# Patient Record
Sex: MALE | Race: WHITE | NOT HISPANIC OR LATINO | Employment: OTHER | URBAN - METROPOLITAN AREA
[De-identification: names, ages, dates, MRNs, and addresses within clinical notes are randomized per-mention and may not be internally consistent; named-entity substitution may affect disease eponyms.]

---

## 2017-12-28 PROCEDURE — 96372 THER/PROPH/DIAG INJ SC/IM: CPT

## 2017-12-28 PROCEDURE — 303977 HCHG I & D

## 2017-12-28 PROCEDURE — 99284 EMERGENCY DEPT VISIT MOD MDM: CPT

## 2017-12-29 ENCOUNTER — HOSPITAL ENCOUNTER (EMERGENCY)
Facility: MEDICAL CENTER | Age: 42
End: 2017-12-29
Attending: EMERGENCY MEDICINE
Payer: MEDICAID

## 2017-12-29 VITALS
TEMPERATURE: 98.7 F | WEIGHT: 200.62 LBS | BODY MASS INDEX: 29.71 KG/M2 | RESPIRATION RATE: 16 BRPM | HEIGHT: 69 IN | DIASTOLIC BLOOD PRESSURE: 75 MMHG | SYSTOLIC BLOOD PRESSURE: 119 MMHG | HEART RATE: 100 BPM | OXYGEN SATURATION: 93 %

## 2017-12-29 DIAGNOSIS — L02.811 SCALP ABSCESS: ICD-10-CM

## 2017-12-29 DIAGNOSIS — L03.811 CELLULITIS OF HEAD OR SCALP: ICD-10-CM

## 2017-12-29 PROCEDURE — 700111 HCHG RX REV CODE 636 W/ 250 OVERRIDE (IP): Performed by: EMERGENCY MEDICINE

## 2017-12-29 PROCEDURE — 306637 HCHG MISC ORTHO ITEM RC 0274

## 2017-12-29 PROCEDURE — A9270 NON-COVERED ITEM OR SERVICE: HCPCS | Performed by: EMERGENCY MEDICINE

## 2017-12-29 PROCEDURE — 700102 HCHG RX REV CODE 250 W/ 637 OVERRIDE(OP): Performed by: EMERGENCY MEDICINE

## 2017-12-29 PROCEDURE — 10061 I&D ABSCESS COMP/MULTIPLE: CPT

## 2017-12-29 RX ORDER — CEPHALEXIN 250 MG/1
500 CAPSULE ORAL ONCE
Status: COMPLETED | OUTPATIENT
Start: 2017-12-29 | End: 2017-12-29

## 2017-12-29 RX ORDER — CEPHALEXIN 250 MG/1
250 CAPSULE ORAL 4 TIMES DAILY
Qty: 28 CAP | Refills: 0 | Status: SHIPPED | OUTPATIENT
Start: 2017-12-29 | End: 2018-01-05

## 2017-12-29 RX ORDER — SULFAMETHOXAZOLE AND TRIMETHOPRIM 800; 160 MG/1; MG/1
1 TABLET ORAL ONCE
Status: COMPLETED | OUTPATIENT
Start: 2017-12-29 | End: 2017-12-29

## 2017-12-29 RX ORDER — KETOROLAC TROMETHAMINE 30 MG/ML
30 INJECTION, SOLUTION INTRAMUSCULAR; INTRAVENOUS ONCE
Status: COMPLETED | OUTPATIENT
Start: 2017-12-29 | End: 2017-12-29

## 2017-12-29 RX ORDER — SULFAMETHOXAZOLE AND TRIMETHOPRIM 800; 160 MG/1; MG/1
1 TABLET ORAL 2 TIMES DAILY
Qty: 14 TAB | Refills: 0 | Status: SHIPPED | OUTPATIENT
Start: 2017-12-29 | End: 2018-01-05

## 2017-12-29 RX ORDER — METHADONE HYDROCHLORIDE 10 MG/1
10 TABLET ORAL
Status: SHIPPED | COMMUNITY
End: 2019-05-05

## 2017-12-29 RX ORDER — HYDROMORPHONE HYDROCHLORIDE 2 MG/ML
2 INJECTION, SOLUTION INTRAMUSCULAR; INTRAVENOUS; SUBCUTANEOUS ONCE
Status: COMPLETED | OUTPATIENT
Start: 2017-12-29 | End: 2017-12-29

## 2017-12-29 RX ADMIN — KETOROLAC TROMETHAMINE 30 MG: 30 INJECTION, SOLUTION INTRAMUSCULAR at 02:15

## 2017-12-29 RX ADMIN — HYDROMORPHONE HYDROCHLORIDE 2 MG: 2 INJECTION, SOLUTION INTRAMUSCULAR; INTRAVENOUS; SUBCUTANEOUS at 02:32

## 2017-12-29 RX ADMIN — CEPHALEXIN 500 MG: 250 CAPSULE ORAL at 02:33

## 2017-12-29 RX ADMIN — SULFAMETHOXAZOLE AND TRIMETHOPRIM 1 TABLET: 800; 160 TABLET ORAL at 02:33

## 2017-12-29 ASSESSMENT — PAIN SCALES - GENERAL: PAINLEVEL_OUTOF10: 7

## 2017-12-29 NOTE — ED NOTES
Discharge instructions provided.  Rx x1 given and educated on how and when to take medication, educated on s/s of infection and wound care, Pt verbalized the understanding of discharge instructions to follow up with PCP and to return to ER if condition worsens.  Pt ambulated out of ER without difficulty.

## 2017-12-29 NOTE — ED NOTES
".  Chief Complaint   Patient presents with   • Abscess     since last monday, pt fell area to back of his neck R side swollen, pt came in today due to feeling the abscess \"rupture and leak\"      .Blood pressure 132/86, pulse 95, temperature 37.1 °C (98.7 °F), resp. rate 16, height 1.753 m (5' 9\"), weight 91 kg (200 lb 9.9 oz), SpO2 95 %.      "

## 2017-12-29 NOTE — ED PROVIDER NOTES
"ED Provider Note    CHIEF COMPLAINT  Chief Complaint   Patient presents with   • Abscess       HPI  Familia Robert is a 42 y.o. male who presents To the emergency department with chief complaints of posterior scalp abscess. The patient states started swelling Monday he put an ice pack on it went down and then he continued to swell actually ruptured last night. He continues to have pain in the back of the head but denies fevers chills nausea or vomiting vision changes or severe headache. He mostly has pain at the site of his scalp injury. Never had abscesses before is up-to-date on his tetanus, pain is currently a 6 out of 10 and sharp in nature worse with palpation, the patient is on methadone and oxycodone for chronic arthritis pain.    REVIEW OF SYSTEMS  See HPI for further details. All other systems are negative.     PAST MEDICAL HISTORY   has a past medical history of Chronic back pain.    SOCIAL HISTORY  Social History     Social History Main Topics   • Smoking status: Never Smoker   • Smokeless tobacco: Never Used   • Alcohol use Yes   • Drug use: No   • Sexual activity: Not on file       SURGICAL HISTORY  patient denies any surgical history    CURRENT MEDICATIONS  Home Medications    **Home medications have not yet been reviewed for this encounter**         ALLERGIES  Allergies   Allergen Reactions   • Nkda [No Known Drug Allergy]        PHYSICAL EXAM  VITAL SIGNS: /78   Pulse 99   Temp 37.1 °C (98.7 °F)   Resp 16   Ht 1.753 m (5' 9\")   Wt 91 kg (200 lb 9.9 oz)   SpO2 96%   BMI 29.63 kg/m²    Pulse ox interpretation: I interpret this pulse ox as normal.  Constitutional: Alert in no apparent distress.  HENT: Normocephalic, Atraumatic  R posterior occipital scalp abscess with fluctuance, erythema and mild discharge - 8x5cm , 2nd smaller left scalp abscess only 1 x 2 cm  Eyes: PERRL. Conjunctiva normal, non-icteric.   Heart: Regular rate and rythm, no murmurs.    Lungs: Clear to auscultation " bilaterally. No resp distress, breath sounds equal  Abdomen: Non-tender, non-distended, normal bowel sounds  Skin: Warm, Dry, No erythema, No rash.   Neurologic: Alert and oriented, Grossly non-focal.       DIFFERENTIAL DIAGNOSIS AND WORK UP PLAN    This is a 42 y.o. male who presents with 2 abscesses of the scalp, patient has some drainage will perform bilateral I&D is as well as treatment for sialitis abscess with Bactrim Keflex.      INCISION AND DRAINAGE PROCEDURE NOTE:  Patient identification was confirmed and consent was obtained.  This procedure was performed by Dr. Ivey at 2:23 AM.  Site: R posterior scalp  Sterile procedures observed  Anesthetic used (type and amt): lidocaine 1% w epi 6 cc's  Blade size: 11  Drainage: moderate blood and pus  The patient had extreme pain during this procedure that was not relieved with Toradol nor lidocaine, I still had a large amount of pus coming out and the patient needed to stop. He'll be treated with IM Dilaudid and then reattempted the continuing I&D as well as the 2nd I&D  Site anesthetized, incision made over site, wound drained and explored loculations, rinsed with copious amounts of normal saline    INCISION AND DRAINAGE PROCEDURE NOTE:  Patient identification was confirmed and consent was obtained.  This procedure was performed by Dr. Ivey at 3:13 AM  Site: posterior scalp  Sterile procedures observed  Anesthetic used (type and amt): lidocaine 1% with epi  Blade size: 1, cruciate incisions made in both abscesses  Drainage:  Moderate blood and pus    Site anesthetized, incision made over site, wound drained and explored loculations, rinsed with copious amounts of normal saline, covered with dry, sterile dressing. Pt tolerated procedure well without complications. Instructions for care discussed verbally and pt provided with additional written instructions for homecare and f/u.        COURSE & MEDICAL DECISION MAKING  Pertinent Labs & Imaging studies reviewed.  "(See chart for details)    3:15 AM  Patient tolerated much better after the Dilaudid was started on a large amount of pus out and I was just getting small amounts of blood by the and with copious drainage of breaking up loculations. Patient actually has follow-up with his primary care provider later this morning and he will return to the ED with any new or worsening symptoms such as headache and severe neck pain worsening fevers and chills despite treatment at home. He will wash out his wound at least 3-4 times over the next 2-3 days.    /75   Pulse 100   Temp 37.1 °C (98.7 °F)   Resp 16   Ht 1.753 m (5' 9\")   Wt 91 kg (200 lb 9.9 oz)   SpO2 93%   BMI 29.63 kg/m²     The patient will return for new or worsening symptoms and is stable at the time of discharge.    The patient is referred to a primary physician for blood pressure management, diabetic screening, and for all other preventative health concerns.    DISPOSITION:  Patient will be discharged home in stable condition.    FOLLOW UP:  Your Primary today as scheduled          [facility], Emergency Dept  [address]  [facility] [address]  [phone]    If symptoms worsen - fevers, vomiting, worsening swelling despite treatment      OUTPATIENT MEDICATIONS:  New Prescriptions    CEPHALEXIN (KEFLEX) 250 MG CAP    Take 1 Cap by mouth 4 times a day for 7 days.    SULFAMETHOXAZOLE-TRIMETHOPRIM (BACTRIM DS) 800-160 MG TABLET    Take 1 Tab by mouth 2 times a day for 7 days.           FINAL IMPRESSION  1. Scalp abscess    2. Cellulitis of head or scalp            Electronically signed by: Bailey Ivey, 12/29/2017 1:18 AM    This dictation has been created using voice recognition software and/or scribes. The accuracy of the dictation is limited by the abilities of the software and the expertise of the scribes. I expect there may be some errors of grammar and possibly content. I made every attempt to manually correct the " errors within my dictation. However, errors related to voice recognition software and/or scribes may still exist and should be interpreted within the appropriate context.

## 2017-12-29 NOTE — DISCHARGE INSTRUCTIONS
Abscess  Care After  An abscess (also called a boil or furuncle) is an infected area that contains a collection of pus. Signs and symptoms of an abscess include pain, tenderness, redness, or hardness, or you may feel a moveable soft area under your skin. An abscess can occur anywhere in the body. The infection may spread to surrounding tissues causing cellulitis. A cut (incision) by the surgeon was made over your abscess and the pus was drained out. The boil may be painful for 5 to 7 days. Most people with a boil do not have high fevers. Your abscess, if seen early, may not have localized, and may not have been lanced. If not, another appointment may be required for this if it does not get better on its own or with medications.  HOME CARE INSTRUCTIONS   · Only take over-the-counter or prescription medicines for pain, discomfort, or fever as directed by your caregiver.  · Washout the wound at least 3-4 times each day over the next 2-3 days copiously.  SEEK IMMEDIATE MEDICAL CARE IF:   · You develop increased pain, swelling, redness, drainage, or bleeding in the wound site.  · You develop signs of generalized infection including muscle aches, chills, fever, or a general ill feeling.  · An oral temperature above 102° F (38.9° C) develops, not controlled by medication.  See your caregiver for a recheck if you develop any of the symptoms described above. If medications (antibiotics) were prescribed, take them as directed.  Document Released: 07/06/2006 Document Revised: 03/11/2013 Document Reviewed: 03/02/2009  [employer]® Patient Information ©2014 [email or web address].

## 2019-05-05 ENCOUNTER — ANESTHESIA EVENT (OUTPATIENT)
Dept: SURGERY | Facility: MEDICAL CENTER | Age: 44
DRG: 340 | End: 2019-05-05
Payer: MEDICAID

## 2019-05-05 ENCOUNTER — HOSPITAL ENCOUNTER (INPATIENT)
Facility: MEDICAL CENTER | Age: 44
LOS: 2 days | DRG: 340 | End: 2019-05-07
Attending: EMERGENCY MEDICINE | Admitting: SURGERY
Payer: MEDICAID

## 2019-05-05 ENCOUNTER — APPOINTMENT (OUTPATIENT)
Dept: RADIOLOGY | Facility: MEDICAL CENTER | Age: 44
DRG: 340 | End: 2019-05-05
Attending: EMERGENCY MEDICINE
Payer: MEDICAID

## 2019-05-05 ENCOUNTER — ANESTHESIA (OUTPATIENT)
Dept: SURGERY | Facility: MEDICAL CENTER | Age: 44
DRG: 340 | End: 2019-05-05
Payer: MEDICAID

## 2019-05-05 DIAGNOSIS — G89.18 POSTOPERATIVE PAIN: ICD-10-CM

## 2019-05-05 DIAGNOSIS — K35.201 ACUTE APPENDICITIS WITH PERFORATION AND GENERALIZED PERITONITIS, WITHOUT ABSCESS, UNSPECIFIED WHETHER GANGRENE PRESENT: ICD-10-CM

## 2019-05-05 LAB
ALBUMIN SERPL BCP-MCNC: 4.7 G/DL (ref 3.2–4.9)
ALBUMIN/GLOB SERPL: 1.5 G/DL
ALP SERPL-CCNC: 65 U/L (ref 30–99)
ALT SERPL-CCNC: 11 U/L (ref 2–50)
ANION GAP SERPL CALC-SCNC: 13 MMOL/L (ref 0–11.9)
AST SERPL-CCNC: 14 U/L (ref 12–45)
BASOPHILS # BLD AUTO: 0.1 % (ref 0–1.8)
BASOPHILS # BLD: 0.02 K/UL (ref 0–0.12)
BILIRUB SERPL-MCNC: 0.9 MG/DL (ref 0.1–1.5)
BUN SERPL-MCNC: 13 MG/DL (ref 8–22)
CALCIUM SERPL-MCNC: 9.8 MG/DL (ref 8.5–10.5)
CHLORIDE SERPL-SCNC: 98 MMOL/L (ref 96–112)
CO2 SERPL-SCNC: 24 MMOL/L (ref 20–33)
CREAT SERPL-MCNC: 0.82 MG/DL (ref 0.5–1.4)
EOSINOPHIL # BLD AUTO: 0.01 K/UL (ref 0–0.51)
EOSINOPHIL NFR BLD: 0.1 % (ref 0–6.9)
ERYTHROCYTE [DISTWIDTH] IN BLOOD BY AUTOMATED COUNT: 40.2 FL (ref 35.9–50)
GLOBULIN SER CALC-MCNC: 3.1 G/DL (ref 1.9–3.5)
GLUCOSE SERPL-MCNC: 151 MG/DL (ref 65–99)
HCT VFR BLD AUTO: 46.1 % (ref 42–52)
HGB BLD-MCNC: 16.1 G/DL (ref 14–18)
IMM GRANULOCYTES # BLD AUTO: 0.06 K/UL (ref 0–0.11)
IMM GRANULOCYTES NFR BLD AUTO: 0.4 % (ref 0–0.9)
LIPASE SERPL-CCNC: 25 U/L (ref 11–82)
LYMPHOCYTES # BLD AUTO: 0.7 K/UL (ref 1–4.8)
LYMPHOCYTES NFR BLD: 4.2 % (ref 22–41)
MCH RBC QN AUTO: 29.4 PG (ref 27–33)
MCHC RBC AUTO-ENTMCNC: 34.9 G/DL (ref 33.7–35.3)
MCV RBC AUTO: 84.1 FL (ref 81.4–97.8)
MONOCYTES # BLD AUTO: 1.06 K/UL (ref 0–0.85)
MONOCYTES NFR BLD AUTO: 6.4 % (ref 0–13.4)
NEUTROPHILS # BLD AUTO: 14.77 K/UL (ref 1.82–7.42)
NEUTROPHILS NFR BLD: 88.8 % (ref 44–72)
NRBC # BLD AUTO: 0 K/UL
NRBC BLD-RTO: 0 /100 WBC
PLATELET # BLD AUTO: 272 K/UL (ref 164–446)
PMV BLD AUTO: 9.2 FL (ref 9–12.9)
POTASSIUM SERPL-SCNC: 3.6 MMOL/L (ref 3.6–5.5)
PROT SERPL-MCNC: 7.8 G/DL (ref 6–8.2)
RBC # BLD AUTO: 5.48 M/UL (ref 4.7–6.1)
SODIUM SERPL-SCNC: 135 MMOL/L (ref 135–145)
WBC # BLD AUTO: 16.6 K/UL (ref 4.8–10.8)

## 2019-05-05 PROCEDURE — 501429 HCHG STAPLER, ENDO GIA UNIV: Performed by: SURGERY

## 2019-05-05 PROCEDURE — 160039 HCHG SURGERY MINUTES - EA ADDL 1 MIN LEVEL 3: Performed by: SURGERY

## 2019-05-05 PROCEDURE — 304561 HCHG STAT O2

## 2019-05-05 PROCEDURE — 96375 TX/PRO/DX INJ NEW DRUG ADDON: CPT

## 2019-05-05 PROCEDURE — 88304 TISSUE EXAM BY PATHOLOGIST: CPT

## 2019-05-05 PROCEDURE — 160035 HCHG PACU - 1ST 60 MINS PHASE I: Performed by: SURGERY

## 2019-05-05 PROCEDURE — 501399 HCHG SPECIMAN BAG, ENDO CATC: Performed by: SURGERY

## 2019-05-05 PROCEDURE — 700101 HCHG RX REV CODE 250: Performed by: ANESTHESIOLOGY

## 2019-05-05 PROCEDURE — 160028 HCHG SURGERY MINUTES - 1ST 30 MINS LEVEL 3: Performed by: SURGERY

## 2019-05-05 PROCEDURE — 36415 COLL VENOUS BLD VENIPUNCTURE: CPT

## 2019-05-05 PROCEDURE — 700117 HCHG RX CONTRAST REV CODE 255: Performed by: EMERGENCY MEDICINE

## 2019-05-05 PROCEDURE — 96376 TX/PRO/DX INJ SAME DRUG ADON: CPT

## 2019-05-05 PROCEDURE — 160009 HCHG ANES TIME/MIN: Performed by: SURGERY

## 2019-05-05 PROCEDURE — 500002 HCHG ADHESIVE, DERMABOND: Performed by: SURGERY

## 2019-05-05 PROCEDURE — 96365 THER/PROPH/DIAG IV INF INIT: CPT

## 2019-05-05 PROCEDURE — 700111 HCHG RX REV CODE 636 W/ 250 OVERRIDE (IP): Performed by: EMERGENCY MEDICINE

## 2019-05-05 PROCEDURE — 160002 HCHG RECOVERY MINUTES (STAT): Performed by: SURGERY

## 2019-05-05 PROCEDURE — 700111 HCHG RX REV CODE 636 W/ 250 OVERRIDE (IP): Performed by: SURGERY

## 2019-05-05 PROCEDURE — 160036 HCHG PACU - EA ADDL 30 MINS PHASE I: Performed by: SURGERY

## 2019-05-05 PROCEDURE — 700101 HCHG RX REV CODE 250

## 2019-05-05 PROCEDURE — 501583 HCHG TROCAR, THRD CAN&SEAL 5X100: Performed by: SURGERY

## 2019-05-05 PROCEDURE — 700111 HCHG RX REV CODE 636 W/ 250 OVERRIDE (IP): Performed by: ANESTHESIOLOGY

## 2019-05-05 PROCEDURE — 501463 HCHG STAPLES, UNIV. ROTIC: Performed by: SURGERY

## 2019-05-05 PROCEDURE — 500868 HCHG NEEDLE, SURGI(VARES): Performed by: SURGERY

## 2019-05-05 PROCEDURE — 700102 HCHG RX REV CODE 250 W/ 637 OVERRIDE(OP): Performed by: SURGERY

## 2019-05-05 PROCEDURE — 502571 HCHG PACK, LAP CHOLE: Performed by: SURGERY

## 2019-05-05 PROCEDURE — 0DTJ4ZZ RESECTION OF APPENDIX, PERCUTANEOUS ENDOSCOPIC APPROACH: ICD-10-PCS | Performed by: SURGERY

## 2019-05-05 PROCEDURE — 700105 HCHG RX REV CODE 258: Performed by: SURGERY

## 2019-05-05 PROCEDURE — 700105 HCHG RX REV CODE 258: Performed by: ANESTHESIOLOGY

## 2019-05-05 PROCEDURE — 770006 HCHG ROOM/CARE - MED/SURG/GYN SEMI*

## 2019-05-05 PROCEDURE — 501571 HCHG TROCAR, SEPARATOR 12X100: Performed by: SURGERY

## 2019-05-05 PROCEDURE — 501570 HCHG TROCAR, SEPARATOR: Performed by: SURGERY

## 2019-05-05 PROCEDURE — 85025 COMPLETE CBC W/AUTO DIFF WBC: CPT

## 2019-05-05 PROCEDURE — 700101 HCHG RX REV CODE 250: Performed by: SURGERY

## 2019-05-05 PROCEDURE — 83690 ASSAY OF LIPASE: CPT

## 2019-05-05 PROCEDURE — 160048 HCHG OR STATISTICAL LEVEL 1-5: Performed by: SURGERY

## 2019-05-05 PROCEDURE — A9270 NON-COVERED ITEM OR SERVICE: HCPCS | Performed by: SURGERY

## 2019-05-05 PROCEDURE — 700105 HCHG RX REV CODE 258: Performed by: EMERGENCY MEDICINE

## 2019-05-05 PROCEDURE — 99291 CRITICAL CARE FIRST HOUR: CPT

## 2019-05-05 PROCEDURE — 80053 COMPREHEN METABOLIC PANEL: CPT

## 2019-05-05 PROCEDURE — 74177 CT ABD & PELVIS W/CONTRAST: CPT

## 2019-05-05 RX ORDER — ONDANSETRON 2 MG/ML
8 INJECTION INTRAMUSCULAR; INTRAVENOUS ONCE
Status: COMPLETED | OUTPATIENT
Start: 2019-05-05 | End: 2019-05-05

## 2019-05-05 RX ORDER — OXYCODONE HYDROCHLORIDE AND ACETAMINOPHEN 5; 325 MG/1; MG/1
2 TABLET ORAL
Status: DISCONTINUED | OUTPATIENT
Start: 2019-05-05 | End: 2019-05-05 | Stop reason: HOSPADM

## 2019-05-05 RX ORDER — PIPERACILLIN SODIUM, TAZOBACTAM SODIUM 3; .375 G/15ML; G/15ML
INJECTION, POWDER, LYOPHILIZED, FOR SOLUTION INTRAVENOUS PRN
Status: DISCONTINUED | OUTPATIENT
Start: 2019-05-05 | End: 2019-05-05 | Stop reason: SURG

## 2019-05-05 RX ORDER — OXYCODONE HCL 20 MG/1
20 TABLET, FILM COATED, EXTENDED RELEASE ORAL EVERY 12 HOURS
Status: DISCONTINUED | OUTPATIENT
Start: 2019-05-05 | End: 2019-05-07 | Stop reason: HOSPADM

## 2019-05-05 RX ORDER — ONDANSETRON 2 MG/ML
4 INJECTION INTRAMUSCULAR; INTRAVENOUS
Status: DISCONTINUED | OUTPATIENT
Start: 2019-05-05 | End: 2019-05-05 | Stop reason: HOSPADM

## 2019-05-05 RX ORDER — MAGNESIUM HYDROXIDE 1200 MG/15ML
LIQUID ORAL
Status: COMPLETED | OUTPATIENT
Start: 2019-05-05 | End: 2019-05-05

## 2019-05-05 RX ORDER — HYDROMORPHONE HYDROCHLORIDE 1 MG/ML
0.2 INJECTION, SOLUTION INTRAMUSCULAR; INTRAVENOUS; SUBCUTANEOUS
Status: DISCONTINUED | OUTPATIENT
Start: 2019-05-05 | End: 2019-05-05 | Stop reason: HOSPADM

## 2019-05-05 RX ORDER — ONDANSETRON 2 MG/ML
INJECTION INTRAMUSCULAR; INTRAVENOUS PRN
Status: DISCONTINUED | OUTPATIENT
Start: 2019-05-05 | End: 2019-05-05 | Stop reason: SURG

## 2019-05-05 RX ORDER — MEPERIDINE HYDROCHLORIDE 25 MG/ML
6.25 INJECTION INTRAMUSCULAR; INTRAVENOUS; SUBCUTANEOUS
Status: DISCONTINUED | OUTPATIENT
Start: 2019-05-05 | End: 2019-05-05 | Stop reason: HOSPADM

## 2019-05-05 RX ORDER — ONDANSETRON 2 MG/ML
4 INJECTION INTRAMUSCULAR; INTRAVENOUS EVERY 4 HOURS PRN
Status: DISCONTINUED | OUTPATIENT
Start: 2019-05-05 | End: 2019-05-07 | Stop reason: HOSPADM

## 2019-05-05 RX ORDER — HYDROMORPHONE HYDROCHLORIDE 1 MG/ML
0.1 INJECTION, SOLUTION INTRAMUSCULAR; INTRAVENOUS; SUBCUTANEOUS
Status: DISCONTINUED | OUTPATIENT
Start: 2019-05-05 | End: 2019-05-05 | Stop reason: HOSPADM

## 2019-05-05 RX ORDER — SODIUM CHLORIDE, SODIUM LACTATE, POTASSIUM CHLORIDE, CALCIUM CHLORIDE 600; 310; 30; 20 MG/100ML; MG/100ML; MG/100ML; MG/100ML
INJECTION, SOLUTION INTRAVENOUS CONTINUOUS
Status: DISCONTINUED | OUTPATIENT
Start: 2019-05-05 | End: 2019-05-05 | Stop reason: ALTCHOICE

## 2019-05-05 RX ORDER — OXYCODONE HYDROCHLORIDE 5 MG/1
10 TABLET ORAL
Status: DISCONTINUED | OUTPATIENT
Start: 2019-05-05 | End: 2019-05-06

## 2019-05-05 RX ORDER — SODIUM CHLORIDE, SODIUM LACTATE, POTASSIUM CHLORIDE, CALCIUM CHLORIDE 600; 310; 30; 20 MG/100ML; MG/100ML; MG/100ML; MG/100ML
INJECTION, SOLUTION INTRAVENOUS
Status: COMPLETED | OUTPATIENT
Start: 2019-05-05 | End: 2019-05-05

## 2019-05-05 RX ORDER — KETAMINE HYDROCHLORIDE 50 MG/ML
INJECTION, SOLUTION INTRAMUSCULAR; INTRAVENOUS PRN
Status: DISCONTINUED | OUTPATIENT
Start: 2019-05-05 | End: 2019-05-05 | Stop reason: SURG

## 2019-05-05 RX ORDER — KETOROLAC TROMETHAMINE 30 MG/ML
30 INJECTION, SOLUTION INTRAMUSCULAR; INTRAVENOUS EVERY 6 HOURS
Status: DISCONTINUED | OUTPATIENT
Start: 2019-05-06 | End: 2019-05-07 | Stop reason: HOSPADM

## 2019-05-05 RX ORDER — BUPIVACAINE HYDROCHLORIDE AND EPINEPHRINE 5; 5 MG/ML; UG/ML
INJECTION, SOLUTION EPIDURAL; INTRACAUDAL; PERINEURAL
Status: DISCONTINUED | OUTPATIENT
Start: 2019-05-05 | End: 2019-05-05 | Stop reason: HOSPADM

## 2019-05-05 RX ORDER — MIDAZOLAM HYDROCHLORIDE 1 MG/ML
1 INJECTION INTRAMUSCULAR; INTRAVENOUS
Status: DISCONTINUED | OUTPATIENT
Start: 2019-05-05 | End: 2019-05-05 | Stop reason: HOSPADM

## 2019-05-05 RX ORDER — ONDANSETRON 2 MG/ML
4 INJECTION INTRAMUSCULAR; INTRAVENOUS ONCE
Status: COMPLETED | OUTPATIENT
Start: 2019-05-05 | End: 2019-05-05

## 2019-05-05 RX ORDER — MAGNESIUM SULFATE HEPTAHYDRATE 40 MG/ML
INJECTION, SOLUTION INTRAVENOUS PRN
Status: DISCONTINUED | OUTPATIENT
Start: 2019-05-05 | End: 2019-05-05 | Stop reason: SURG

## 2019-05-05 RX ORDER — DIPHENHYDRAMINE HYDROCHLORIDE 50 MG/ML
12.5 INJECTION INTRAMUSCULAR; INTRAVENOUS
Status: DISCONTINUED | OUTPATIENT
Start: 2019-05-05 | End: 2019-05-05 | Stop reason: HOSPADM

## 2019-05-05 RX ORDER — OXYCODONE HYDROCHLORIDE AND ACETAMINOPHEN 5; 325 MG/1; MG/1
1 TABLET ORAL
Status: DISCONTINUED | OUTPATIENT
Start: 2019-05-05 | End: 2019-05-05 | Stop reason: HOSPADM

## 2019-05-05 RX ORDER — HYDROMORPHONE HYDROCHLORIDE 1 MG/ML
0.4 INJECTION, SOLUTION INTRAMUSCULAR; INTRAVENOUS; SUBCUTANEOUS
Status: DISCONTINUED | OUTPATIENT
Start: 2019-05-05 | End: 2019-05-05 | Stop reason: HOSPADM

## 2019-05-05 RX ORDER — SODIUM CHLORIDE, SODIUM LACTATE, POTASSIUM CHLORIDE, CALCIUM CHLORIDE 600; 310; 30; 20 MG/100ML; MG/100ML; MG/100ML; MG/100ML
INJECTION, SOLUTION INTRAVENOUS
Status: DISCONTINUED | OUTPATIENT
Start: 2019-05-05 | End: 2019-05-05 | Stop reason: SURG

## 2019-05-05 RX ORDER — OXYCODONE HYDROCHLORIDE 5 MG/1
20 TABLET ORAL
Status: DISCONTINUED | OUTPATIENT
Start: 2019-05-05 | End: 2019-05-06

## 2019-05-05 RX ORDER — HALOPERIDOL 5 MG/ML
1 INJECTION INTRAMUSCULAR
Status: DISCONTINUED | OUTPATIENT
Start: 2019-05-05 | End: 2019-05-05 | Stop reason: HOSPADM

## 2019-05-05 RX ORDER — HYDROMORPHONE HYDROCHLORIDE 1 MG/ML
1 INJECTION, SOLUTION INTRAMUSCULAR; INTRAVENOUS; SUBCUTANEOUS ONCE
Status: COMPLETED | OUTPATIENT
Start: 2019-05-05 | End: 2019-05-05

## 2019-05-05 RX ORDER — HEPARIN SODIUM 5000 [USP'U]/ML
5000 INJECTION, SOLUTION INTRAVENOUS; SUBCUTANEOUS EVERY 8 HOURS
Status: DISCONTINUED | OUTPATIENT
Start: 2019-05-06 | End: 2019-05-07 | Stop reason: HOSPADM

## 2019-05-05 RX ORDER — HYDROMORPHONE HYDROCHLORIDE 2 MG/ML
INJECTION, SOLUTION INTRAMUSCULAR; INTRAVENOUS; SUBCUTANEOUS PRN
Status: DISCONTINUED | OUTPATIENT
Start: 2019-05-05 | End: 2019-05-05 | Stop reason: SURG

## 2019-05-05 RX ORDER — KETOROLAC TROMETHAMINE 30 MG/ML
INJECTION, SOLUTION INTRAMUSCULAR; INTRAVENOUS PRN
Status: DISCONTINUED | OUTPATIENT
Start: 2019-05-05 | End: 2019-05-05 | Stop reason: SURG

## 2019-05-05 RX ORDER — LABETALOL HYDROCHLORIDE 5 MG/ML
5 INJECTION, SOLUTION INTRAVENOUS
Status: DISCONTINUED | OUTPATIENT
Start: 2019-05-05 | End: 2019-05-05 | Stop reason: HOSPADM

## 2019-05-05 RX ORDER — SODIUM CHLORIDE 9 MG/ML
1000 INJECTION, SOLUTION INTRAVENOUS ONCE
Status: COMPLETED | OUTPATIENT
Start: 2019-05-05 | End: 2019-05-05

## 2019-05-05 RX ORDER — ACETAMINOPHEN 500 MG
1000 TABLET ORAL EVERY 6 HOURS
Status: DISCONTINUED | OUTPATIENT
Start: 2019-05-05 | End: 2019-05-07 | Stop reason: HOSPADM

## 2019-05-05 RX ORDER — DEXAMETHASONE SODIUM PHOSPHATE 4 MG/ML
INJECTION, SOLUTION INTRA-ARTICULAR; INTRALESIONAL; INTRAMUSCULAR; INTRAVENOUS; SOFT TISSUE PRN
Status: DISCONTINUED | OUTPATIENT
Start: 2019-05-05 | End: 2019-05-05 | Stop reason: SURG

## 2019-05-05 RX ORDER — DEXTROSE MONOHYDRATE, SODIUM CHLORIDE, AND POTASSIUM CHLORIDE 50; 1.49; 4.5 G/1000ML; G/1000ML; G/1000ML
INJECTION, SOLUTION INTRAVENOUS CONTINUOUS
Status: DISCONTINUED | OUTPATIENT
Start: 2019-05-05 | End: 2019-05-06

## 2019-05-05 RX ORDER — DEXAMETHASONE SODIUM PHOSPHATE 4 MG/ML
4 INJECTION, SOLUTION INTRA-ARTICULAR; INTRALESIONAL; INTRAMUSCULAR; INTRAVENOUS; SOFT TISSUE
Status: COMPLETED | OUTPATIENT
Start: 2019-05-05 | End: 2019-05-06

## 2019-05-05 RX ADMIN — OXYCODONE HYDROCHLORIDE 10 MG: 5 TABLET ORAL at 22:35

## 2019-05-05 RX ADMIN — PIPERACILLIN AND TAZOBACTAM 3.38 G: 3; .375 INJECTION, POWDER, LYOPHILIZED, FOR SOLUTION INTRAVENOUS; PARENTERAL at 15:15

## 2019-05-05 RX ADMIN — POTASSIUM CHLORIDE, DEXTROSE MONOHYDRATE AND SODIUM CHLORIDE: 150; 5; 450 INJECTION, SOLUTION INTRAVENOUS at 18:26

## 2019-05-05 RX ADMIN — PROPOFOL 30 MG: 10 INJECTION, EMULSION INTRAVENOUS at 16:35

## 2019-05-05 RX ADMIN — HYDROMORPHONE HYDROCHLORIDE 1 MG: 2 INJECTION, SOLUTION INTRAMUSCULAR; INTRAVENOUS; SUBCUTANEOUS at 16:35

## 2019-05-05 RX ADMIN — ACETAMINOPHEN 1000 MG: 500 TABLET ORAL at 18:26

## 2019-05-05 RX ADMIN — LIDOCAINE HYDROCHLORIDE 100 MG: 20 INJECTION, SOLUTION INFILTRATION; PERINEURAL at 15:53

## 2019-05-05 RX ADMIN — KETOROLAC TROMETHAMINE 30 MG: 30 INJECTION, SOLUTION INTRAMUSCULAR at 16:30

## 2019-05-05 RX ADMIN — ONDANSETRON 8 MG: 2 INJECTION INTRAMUSCULAR; INTRAVENOUS at 14:41

## 2019-05-05 RX ADMIN — METRONIDAZOLE 500 MG: 500 INJECTION, SOLUTION INTRAVENOUS at 21:57

## 2019-05-05 RX ADMIN — ONDANSETRON 4 MG: 2 INJECTION INTRAMUSCULAR; INTRAVENOUS at 15:54

## 2019-05-05 RX ADMIN — HYDROMORPHONE HYDROCHLORIDE 1 MG: 1 INJECTION, SOLUTION INTRAMUSCULAR; INTRAVENOUS; SUBCUTANEOUS at 13:33

## 2019-05-05 RX ADMIN — PIPERACILLIN SODIUM AND TAZOBACTAM SODIUM 3.38 G: 3; .375 INJECTION, POWDER, FOR SOLUTION INTRAVENOUS at 15:54

## 2019-05-05 RX ADMIN — ONDANSETRON 4 MG: 2 INJECTION INTRAMUSCULAR; INTRAVENOUS at 13:31

## 2019-05-05 RX ADMIN — ROCURONIUM BROMIDE 30 MG: 10 INJECTION, SOLUTION INTRAVENOUS at 15:54

## 2019-05-05 RX ADMIN — KETAMINE HYDROCHLORIDE 50 MG: 50 INJECTION, SOLUTION, CONCENTRATE INTRAMUSCULAR; INTRAVENOUS at 15:51

## 2019-05-05 RX ADMIN — IOHEXOL 25 ML: 240 INJECTION, SOLUTION INTRATHECAL; INTRAVASCULAR; INTRAVENOUS; ORAL at 14:17

## 2019-05-05 RX ADMIN — SUGAMMADEX 200 MG: 100 INJECTION, SOLUTION INTRAVENOUS at 16:36

## 2019-05-05 RX ADMIN — DEXAMETHASONE SODIUM PHOSPHATE 8 MG: 4 INJECTION, SOLUTION INTRA-ARTICULAR; INTRALESIONAL; INTRAMUSCULAR; INTRAVENOUS; SOFT TISSUE at 15:54

## 2019-05-05 RX ADMIN — SODIUM CHLORIDE, POTASSIUM CHLORIDE, SODIUM LACTATE AND CALCIUM CHLORIDE: 600; 310; 30; 20 INJECTION, SOLUTION INTRAVENOUS at 15:50

## 2019-05-05 RX ADMIN — IOHEXOL 100 ML: 350 INJECTION, SOLUTION INTRAVENOUS at 14:16

## 2019-05-05 RX ADMIN — PROPOFOL 80 MG: 10 INJECTION, EMULSION INTRAVENOUS at 15:54

## 2019-05-05 RX ADMIN — HYDROMORPHONE HYDROCHLORIDE 0.5 MG: 2 INJECTION, SOLUTION INTRAMUSCULAR; INTRAVENOUS; SUBCUTANEOUS at 16:18

## 2019-05-05 RX ADMIN — MIDAZOLAM HYDROCHLORIDE 2 MG: 1 INJECTION, SOLUTION INTRAMUSCULAR; INTRAVENOUS at 15:51

## 2019-05-05 RX ADMIN — OXYCODONE HYDROCHLORIDE 20 MG: 20 TABLET, FILM COATED, EXTENDED RELEASE ORAL at 17:33

## 2019-05-05 RX ADMIN — CEFTRIAXONE SODIUM 2 G: 2 INJECTION, POWDER, FOR SOLUTION INTRAMUSCULAR; INTRAVENOUS at 18:26

## 2019-05-05 RX ADMIN — HYDROMORPHONE HYDROCHLORIDE 1 MG: 1 INJECTION, SOLUTION INTRAMUSCULAR; INTRAVENOUS; SUBCUTANEOUS at 14:43

## 2019-05-05 RX ADMIN — OXYCODONE HYDROCHLORIDE 20 MG: 5 TABLET ORAL at 18:26

## 2019-05-05 RX ADMIN — KETOROLAC TROMETHAMINE 30 MG: 30 INJECTION, SOLUTION INTRAMUSCULAR at 23:34

## 2019-05-05 RX ADMIN — SODIUM CHLORIDE 1000 ML: 9 INJECTION, SOLUTION INTRAVENOUS at 13:29

## 2019-05-05 RX ADMIN — MAGNESIUM SULFATE IN WATER 2 G: 40 INJECTION, SOLUTION INTRAVENOUS at 15:51

## 2019-05-05 RX ADMIN — HYDROMORPHONE HYDROCHLORIDE 0.5 MG: 2 INJECTION, SOLUTION INTRAMUSCULAR; INTRAVENOUS; SUBCUTANEOUS at 15:57

## 2019-05-05 RX ADMIN — ACETAMINOPHEN 1000 MG: 500 TABLET ORAL at 23:34

## 2019-05-05 ASSESSMENT — COGNITIVE AND FUNCTIONAL STATUS - GENERAL
MOVING FROM LYING ON BACK TO SITTING ON SIDE OF FLAT BED: A LITTLE
STANDING UP FROM CHAIR USING ARMS: A LITTLE
HELP NEEDED FOR BATHING: A LITTLE
SUGGESTED CMS G CODE MODIFIER MOBILITY: CK
DAILY ACTIVITIY SCORE: 20
MOBILITY SCORE: 18
CLIMB 3 TO 5 STEPS WITH RAILING: A LITTLE
DRESSING REGULAR UPPER BODY CLOTHING: A LITTLE
WALKING IN HOSPITAL ROOM: A LITTLE
TOILETING: A LITTLE
TURNING FROM BACK TO SIDE WHILE IN FLAT BAD: A LITTLE
DRESSING REGULAR LOWER BODY CLOTHING: A LITTLE
SUGGESTED CMS G CODE MODIFIER DAILY ACTIVITY: CJ
MOVING TO AND FROM BED TO CHAIR: A LITTLE

## 2019-05-05 ASSESSMENT — PATIENT HEALTH QUESTIONNAIRE - PHQ9
1. LITTLE INTEREST OR PLEASURE IN DOING THINGS: NOT AT ALL
SUM OF ALL RESPONSES TO PHQ9 QUESTIONS 1 AND 2: 0
2. FEELING DOWN, DEPRESSED, IRRITABLE, OR HOPELESS: NOT AT ALL

## 2019-05-05 ASSESSMENT — PAIN SCALES - GENERAL: PAIN_LEVEL: 5

## 2019-05-05 ASSESSMENT — LIFESTYLE VARIABLES: DO YOU DRINK ALCOHOL: NO

## 2019-05-05 NOTE — ED PROVIDER NOTES
"ED Provider  Scribed for Seymour Koo D.O. by Mana Elizalde. 5/5/2019  1:18 PM    Means of arrival:Walk-In  History obtained from:Patient  History limited by: None    CHIEF COMPLAINT  Chief Complaint   Patient presents with   • Abdominal Pain     ruq worse   • N/V     HPI  Familia Robert is a 43 y.o. male who presents to the ED complaining of abdominal pain with associated vomiting onset 3 days ago.  The patient states that the pain has been constant but worsened last night which prompted him to come to the ED. He states the pain is concentrated on the right side, but radiated to his left side. The patient additionally endorses tactile fever, and loss of apetite but denies dysuria, urinary frequency, urinary urgency, and diarrhea. He states his last normal bowel movement was last Thursday and normal. The patient takes 30mg oxycodone for osteoarthritis twice a day and denies alcohol usage and past abdominal surgery. There are no known alleviating or worsening factors.     REVIEW OF SYSTEMS  See HPI for further details. All other systems are negative.     PAST MEDICAL HISTORY   has a past medical history of Chronic back pain.    SOCIAL HISTORY  Social History     Social History Main Topics   • Smoking status: Never Smoker   • Smokeless tobacco: Never Used   • Alcohol use No   • Drug use: No       SURGICAL HISTORY  patient denies any surgical history    CURRENT MEDICATIONS  Home Medications     Reviewed by Laverne Wilcox R.N. (Registered Nurse) on 05/05/19 at 1254  Med List Status: Partial   Medication Last Dose Status   oxycodone (OXY-IR) 30 MG immediate release tablet 5/4/2019 Active                ALLERGIES  Allergies   Allergen Reactions   • Nkda [No Known Drug Allergy]        PHYSICAL EXAM  VITAL SIGNS: /86   Pulse 94   Temp 37.4 °C (99.3 °F) (Temporal)   Resp 18   Ht 1.753 m (5' 9\")   Wt 79.4 kg (175 lb)   SpO2 91%   BMI 25.84 kg/m²     Pulse ox interpretation: pulse ox is " normal.  Constitutional: Unkempt and odorous. Alert in no apparent distress.  HENT: Poor dentition. No signs of trauma, Bilateral external ears normal, Nose normal.   Eyes: Pupils are equal and reactive, Conjunctiva normal, Non-icteric.   Neck: Normal range of motion, No tenderness, Supple, No stridor.   Lymphatic: No lymphadenopathy noted.   Cardiovascular: Regular rate and rhythm, no murmurs.   Thorax & Lungs: Normal breath sounds, No respiratory distress, No wheezing, No chest tenderness.   Abdomen: Moderate diffuse tenderness with voluntary guarding, worse to right upper quadrant. No distension.  Bowel sounds normal, Soft, No masses, No pulsatile masses. No peritoneal signs.  Skin: Warm, Dry, No erythema, No rash.   Back: No bony tenderness, No CVA tenderness.   Extremities: Intact distal pulses, No edema, No tenderness, No cyanosis,  Negative Uriel's sign.   Musculoskeletal: Good range of motion in all major joints. No tenderness to palpation or major deformities noted.   Neurologic: Alert , Normal motor function, Normal sensory function, No focal deficits noted.   Psychiatric: Affect normal, Judgment normal, Mood normal.       MEDICAL DECISION MAKING  This is a 43 y.o. male who presents complaints of right upper quadrant pain that is now radiated diffusely.  He has a white blood cell count is elevated.  And his CT does show concerns for ruptured appendicitis.  Talk with the surgeon and the patient has been admitted.  IV antibiotics were initiated here, and I with moderate results.    Critical care time 30 minutes    DIAGNOSTIC STUDIES / PROCEDURES    LABS  Results for orders placed or performed during the hospital encounter of 05/05/19   CBC WITH DIFFERENTIAL   Result Value Ref Range    WBC 16.6 (H) 4.8 - 10.8 K/uL    RBC 5.48 4.70 - 6.10 M/uL    Hemoglobin 16.1 14.0 - 18.0 g/dL    Hematocrit 46.1 42.0 - 52.0 %    MCV 84.1 81.4 - 97.8 fL    MCH 29.4 27.0 - 33.0 pg    MCHC 34.9 33.7 - 35.3 g/dL    RDW 40.2 35.9  - 50.0 fL    Platelet Count 272 164 - 446 K/uL    MPV 9.2 9.0 - 12.9 fL    Neutrophils-Polys 88.80 (H) 44.00 - 72.00 %    Lymphocytes 4.20 (L) 22.00 - 41.00 %    Monocytes 6.40 0.00 - 13.40 %    Eosinophils 0.10 0.00 - 6.90 %    Basophils 0.10 0.00 - 1.80 %    Immature Granulocytes 0.40 0.00 - 0.90 %    Nucleated RBC 0.00 /100 WBC    Neutrophils (Absolute) 14.77 (H) 1.82 - 7.42 K/uL    Lymphs (Absolute) 0.70 (L) 1.00 - 4.80 K/uL    Monos (Absolute) 1.06 (H) 0.00 - 0.85 K/uL    Eos (Absolute) 0.01 0.00 - 0.51 K/uL    Baso (Absolute) 0.02 0.00 - 0.12 K/uL    Immature Granulocytes (abs) 0.06 0.00 - 0.11 K/uL    NRBC (Absolute) 0.00 K/uL   COMP METABOLIC PANEL   Result Value Ref Range    Sodium 135 135 - 145 mmol/L    Potassium 3.6 3.6 - 5.5 mmol/L    Chloride 98 96 - 112 mmol/L    Co2 24 20 - 33 mmol/L    Anion Gap 13.0 (H) 0.0 - 11.9    Glucose 151 (H) 65 - 99 mg/dL    Bun 13 8 - 22 mg/dL    Creatinine 0.82 0.50 - 1.40 mg/dL    Calcium 9.8 8.5 - 10.5 mg/dL    AST(SGOT) 14 12 - 45 U/L    ALT(SGPT) 11 2 - 50 U/L    Alkaline Phosphatase 65 30 - 99 U/L    Total Bilirubin 0.9 0.1 - 1.5 mg/dL    Albumin 4.7 3.2 - 4.9 g/dL    Total Protein 7.8 6.0 - 8.2 g/dL    Globulin 3.1 1.9 - 3.5 g/dL    A-G Ratio 1.5 g/dL   LIPASE   Result Value Ref Range    Lipase 25 11 - 82 U/L   ESTIMATED GFR   Result Value Ref Range    GFR If African American >60 >60 mL/min/1.73 m 2    GFR If Non African American >60 >60 mL/min/1.73 m 2     All labs reviewed by me.    RADIOLOGY  CT-ABDOMEN-PELVIS WITH   Final Result      1.  Enlarged appendix with surrounding inflammation is noted consistent with appendicitis. Trace amount of extraluminal air in the region is also noted which could indicate early signs of rupture.      2.  No abscess or nondependent free air is identified at this time.           The radiologist's interpretations of all radiological studies have been reviewed by me.        COURSE  Pertinent Labs & Imaging studies reviewed. (See chart  for details)    1:18 PM - Patient seen and examined at bedside. Discussed plan of care, including IV fluids for dehydration and CT scan to rule out cholecystitis. Patient agrees to the plan of care. The patient will be medicated with zofran 4mg and dilaudid 1mg. Ordered for CT-abdomen, CBC with diff, CMP, lipase, and estimated GFR to evaluate his symptoms. Differential diagnoses include but not limited to: Acute cholecystitis gallbladder disease pancreatitis appendicitis peritonitis.    2:31 PM - I reviewed the labs that showed a white blood count of 16, CT showed probably ruptured appendicitis.  Antibiotics and IV fluids are being initiated. Patient's pain is mildly improved with medication.     2:32 - Paged general surgery.     2:39 PM - Consulted with Dr. Jolly (General surgery), who will make arrangements for surgery.      DISPOSITION:  Patient will most likely go to OR today for surgery.       FINAL IMPRESSION  No diagnosis found.  Acute appendicitis, acute peritonitis, ruptured appendicitis     Mana MCCAULEY (Rosanneibe), am scribing for, and in the presence of, Seymour Koo D.O..    Electronically signed by: Mana Elizalde (Scribkeeley), 5/5/2019    ISeymour D.O. personally performed the services described in this documentation, as scribed by Mana Elizalde in my presence, and it is both accurate and complete. C.     The note accurately reflects work and decisions made by me.  Seymour Koo  5/5/2019  7:32 PM

## 2019-05-05 NOTE — CONSULTS
Surgical History and Physical    Date: 5/5/2019    Requesting Physician: Dr. Koo     Consulting Physician: Antony Jolly M.D. [facility]    Reason for consultation: abdominl pain    CC: abdominal pain    HPI: This is a 43 y.o. male who is presenting with a two day history of right lower quadrant abdominal pain. Pain is sharp, severe, and constant. Pain is associated with nausea, vomiting, obstipation. No fevers of chills. He has not had similar pain in the past.     Past Medical History:   Diagnosis Date   • Chronic back pain        History reviewed. No pertinent surgical history.    Current Facility-Administered Medications   Medication Dose Route Frequency Provider Last Rate Last Dose   • piperacillin-tazobactam (ZOSYN) 3.375 g in  mL IVPB  3.375 g Intravenous Once Seymour Koo D.O.   3.375 g at 05/05/19 1515       Social History     Social History   • Marital status: Single     Spouse name: N/A   • Number of children: N/A   • Years of education: N/A     Occupational History   • Not on file.     Social History Main Topics   • Smoking status: Never Smoker   • Smokeless tobacco: Never Used   • Alcohol use No   • Drug use: No   • Sexual activity: Not on file     Other Topics Concern   • Not on file     Social History Narrative   • No narrative on file       History reviewed. No pertinent family history.    Allergies:  Nkda [no known drug allergy]    Review of Systems:  Constitutional: Negative for fever, chills, weight loss, malaise/fatigue and diaphoresis.   HENT: Negative for hearing loss, ear pain, nosebleeds, congestion, sore throat, neck pain, tinnitus and ear discharge.    Eyes: Negative for blurred vision, double vision, photophobia, pain, discharge and redness.   Respiratory: Negative for cough, hemoptysis, sputum production, shortness of breath, wheezing and stridor.    Cardiovascular: Negative for chest pain, palpitations, orthopnea, claudication, leg swelling and PND.  "  Gastrointestinal: as above   Genitourinary: Negative for dysuria, urgency, frequency, hematuria and flank pain.   Musculoskeletal: Negative for myalgias, back pain, joint pain and falls.   Skin: Negative for itching and rash.  Neurological: Negative for dizziness, tingling, tremors, sensory change, speech change, focal weakness, seizures, loss of consciousness, weakness and headaches.   Endo/Heme/Allergies: Negative for environmental allergies and polydipsia. Does not bruise/bleed easily.   Psychiatric/Behavioral: Negative for depression, suicidal ideas, hallucinations, memory loss and substance abuse. The patient is not nervous/anxious and does not have insomnia.    Physical Exam:  /86   Pulse 73   Temp 37.8 °C (100 °F) (Oral)   Resp (!) 24   Ht 1.753 m (5' 9\")   Wt 79.4 kg (175 lb)   SpO2 94%     Constitutional: he is oriented to person, place, and time.  he appears well-developed and well-nourished. Moderate distress.   Head: Normocephalic and atraumatic.   Neck: Normal range of motion. Neck supple. No JVD present. No tracheal deviation present. No thyromegaly present.   Cardiovascular: Normal rate, regular rhythm, normal heart sounds and intact distal pulses.  Exam reveals no gallop and no friction rub.  No murmur heard.  Pulmonary/Chest: Effort normal and breath sounds normal. No stridor. No respiratory distress. he has no wheezes. She has no rales.   Abdominal: Soft. Bowel sounds are normal. he exhibits no distension and no mass. There is  Tenderness with guarding in the right lower quadrant and positive Rovsing's sign. Musculoskeletal: Normal range of motion. he exhibits no edema and no tenderness.   Neurological: he is alert and oriented to person, place, and time. he has normal reflexes. No cranial nerve deficit. Coordination normal.   Skin: Skin is warm and dry. No rash noted. he is not diaphoretic. No erythema. No pallor.   Psychiatric: he has a normal mood and affect.  Behavior is normal.   "     Labs:  Recent Labs      05/05/19   1320   WBC  16.6*   RBC  5.48   HEMOGLOBIN  16.1   HEMATOCRIT  46.1   MCV  84.1   MCH  29.4   MCHC  34.9   RDW  40.2   PLATELETCT  272   MPV  9.2     Recent Labs      05/05/19   1320   SODIUM  135   POTASSIUM  3.6   CHLORIDE  98   CO2  24   GLUCOSE  151*   BUN  13   CREATININE  0.82   CALCIUM  9.8         Recent Labs      05/05/19   1320   ASTSGOT  14   ALTSGPT  11   TBILIRUBIN  0.9   ALKPHOSPHAT  65   GLOBULIN  3.1       Radiology:  CT:  1.  Enlarged appendix with surrounding inflammation is noted consistent with appendicitis. Trace amount of extraluminal air in the region is also noted which could indicate early signs of rupture.    2.  No abscess or nondependent free air is identified at this time.    Assessment: This is a 43 y.o. With acute appendicitis    Plan: to OR for laparoscopic appendectomy. NPO, IVF, antibiotics in meantime    Thank you very much for this consultation.    Antony Jolly M.D.  [facility]  [phone]

## 2019-05-05 NOTE — ANESTHESIA TIME REPORT
Anesthesia Start and Stop Event Times     Date Time Event    5/5/2019 1550 Anesthesia Start     1649 Anesthesia Stop        Responsible Staff  05/05/19    Name Role Begin End    Vinay Olmos M.D. Anesth 1550 1649        Preop Diagnosis (Free Text):  Pre-op Diagnosis     Appendicitis        Preop Diagnosis (Codes):  Diagnosis Information     Diagnosis Code(s):         Post op Diagnosis  Acute appendicitis      Premium Reason  B. 1st Call    Comments:

## 2019-05-05 NOTE — ANESTHESIA QCDR
2019 [facility] Clinical Data Registry (for Quality Improvement)     Postoperative nausea/vomiting risk protocol (Adult = 18 yrs and Pediatric 3-17 yrs)- (430 and 463)  General inhalation anesthetic (NOT TIVA) with PONV risk factors: Yes  Provision of anti-emetic therapy with at least 2 different classes of agents: Yes   Patient DID NOT receive anti-emetic therapy and reason is documented in Medical Record:  N/A    Multimodal Pain Management- (AQI59)  Patient undergoing Elective Surgery (i.e. Outpatient, or ASC, or Prescheduled Surgery prior to Hospital Admission): No  Use of Multimodal Pain Management, two or more drugs and/or interventions, NOT including systemic opioids: N/A  Exception: Documented allergy to multiple classes of analgesics: N/A    PACU assessment of acute postoperative pain prior to Anesthesia Care End- Applies to Patients Age = 18- (ABG7)  Initial PACU pain score is which of the following: < 7/10  Patient unable to report pain score: N/A    Post-anesthetic transfer of care checklist/protocol to PACU/ICU- (426 and 427)  Upon conclusion of case, patient transferred to which of the following locations: PACU/Non-ICU  Use of transfer checklist/protocol: Yes  Exclusion: Service Performed in Patient Hospital Room (and thus did not require transfer): N/A    PACU Reintubation- (AQI31)  General anesthesia requiring endotracheal intubation (ETT) along with subsequent extubation in OR or PACU: Yes  Required reintubation in the PACU: No   Extubation was a planned trial documented in the medical record prior to removal of the original airway device:  N/A    Unplanned admission to ICU related to anesthesia service up through end of PACU care- (MD51)  Unplanned admission to ICU (not initially anticipated at anesthesia start time): No

## 2019-05-05 NOTE — ANESTHESIA PROCEDURE NOTES
Airway  Date/Time: 5/5/2019 3:56 PM  Performed by: LAN HATHAWAY  Authorized by: LAN HATHAWAY     Location:  OR  Urgency:  Elective  Indications for Airway Management:  Anesthesia  Spontaneous Ventilation: absent    Sedation Level:  Deep  Patient Position:  Sniffing  Mask Difficulty Assessment:  1 - vent by mask  Final Airway Type:  Endotracheal airway  Final Endotracheal Airway:  ETT  Cuffed: Yes    Technique Used for Successful ETT Placement:  Direct laryngoscopy  Insertion Site:  Oral  Blade Type:  Toure  Laryngoscope Blade/Videolaryngoscope Blade Size:  2  ETT Size (mm):  7.5  Measured from:  Lips  ETT to Lips (cm):  21  Placement Verified by: capnometry    Cormack-Lehane Classification:  Grade I - full view of glottis  Number of Attempts at Approach:  1

## 2019-05-05 NOTE — ED NOTES
Med Rec complete per Pt at bedside  Allergies Reviewed  No ABX in the last 30 days     OXYCODONE verified via [employer]Check

## 2019-05-05 NOTE — ED TRIAGE NOTES
.  Chief Complaint   Patient presents with   • Abdominal Pain     ruq worse   • N/V   Agree with triage assessment.  8/10 RUQ pain.  Last emesis approximately 1215 PTA.  No lightheadedness.  No chest pain.  No diarrhea.  Last BM on Thursday.  Pt denies alcohol use.

## 2019-05-05 NOTE — OP REPORT
Operative Report    Date: 5/5/2019    Surgeon: Antony Jolly    Assistant: none    Anesthesiologist: Dr. Olmos    Pre-operative Diagnosis: acute appendicitis        Post-operative Diagnosis: same     Procedure: laparoscopic appendectomy    Findings: distended, nearly ruptured appendix with puss in pelvis and around liver    Procedure in detail: The patient was identified in the pre-operative holding area and brought to the operating room. Correct side and site were identified. Pre-operative antibiotics of Zosyn were administered prior to the procedure. GETA was smoothly induced. The patient was prepped and draped in the usual sterile fashion.     After infiltration of local anesthetic, an incision was made inferior the umbilicus to accomodate a 5 mm trocar. The veress needle was placed intraperitoneally, and sterile saline drop test was performed. The abdomen was insufflated to 15 mmHg, which the patient tolerated well, with initially low insufflation pressures. A 5 mm trocar was placed, and a 5 mm 30 degree lapararoscope was used to survey the abdomen. No evidence of intraperitoneal trauma from Veress or trocar placement was found.    I then placed, under video assistance, a 5 mm trocar in the suprapubic area, and a 12 mm trocar in the left lower quadrant. I visualized the cecum and found the appendix, it was distended with impending ruputre. The appendix was elevated with an atraumatic grasper, a window was created in its mesentery with a [place] dissector, and the appendiceal mesentery was divided with the harmonic. The base of the appendix was divided with a blue load of the endoGIA stapler. The appendix was placed in an endocatch bag and removed from the 12 mm port site. Copious irrigation was performed. The appendiceal artery and base were hemostatic.    All ports were removed with video assist, and were hemostatic. The 12 mm port site fascia was closed with 0 vicryl suture. All skin sites were closed  with subcuticular Monocryl and band-aids were applied.    The patient was awakened from general anesthetic, and was taken to the recovery room in stable condition.    Sponge and needle counts were correct at the end of the case.     Specimen: appendix for permanent pathology    EBL: 20 cc    Dispo: stable, extubated, to PACU    Antony Jolly M.D.  [facility]  [phone]

## 2019-05-05 NOTE — ANESTHESIA PREPROCEDURE EVALUATION
43yoM with chronic back pain on chronic opiates (oxycodone 30mg 4 times daily) presenting with acute appendicitis.    NKDA  Appropriately NPO      Relevant Problems   Within Normal Limits   ANESTHESIA   PULMONARY       Physical Exam    Airway   Mallampati: II       Cardiovascular - normal exam     Dental - normal exam         Pulmonary - normal exam     Abdominal    Neurological - normal exam                 Anesthesia Plan    ASA 2 - emergent   ASA physical status emergent criteria: acute peritonitis    Plan - general       Airway plan will be ETT        Induction: intravenous    Postoperative Plan: Postoperative administration of opioids is intended.    Pertinent diagnostic labs and testing reviewed    Informed Consent:    Anesthetic plan and risks discussed with patient.

## 2019-05-05 NOTE — DISCHARGE PLANNING
Care Transition Team Assessment    Information Source  Orientation : Oriented x 4  Information Given By: Patient  Informant's Name: Familia Robert  Who is responsible for making decisions for patient? : Patient    Lives alone in single story home, no stairs.  Uses [facility] pharmacy.    Readmission Evaluation  Is this a readmission?: No    Elopement Risk  Legal Hold: No  Elopement Risk: Not at Risk for Elopement    Interdisciplinary Discharge Planning  Does Admitting Nurse Feel This Could be a Complex Discharge?: No    Discharge Preparedness  Prior Functional Level: Independent with Activities of Daily Living    Functional Assesment  Prior Functional Level: Independent with Activities of Daily Living    Finances  Financial Barriers to Discharge: No  Prescription Coverage: Yes    Vision / Hearing Impairment  Vision Impairment : No  Hearing Impairment : No         Advance Directive  Advance Directive?: None    Domestic Abuse  Have you ever been the victim of abuse or violence?: No    Psychological Assessment  History of Substance Abuse: None         Anticipated Discharge Information  Anticipated discharge disposition: Home

## 2019-05-05 NOTE — ED TRIAGE NOTES
.  Chief Complaint   Patient presents with   • Abdominal Pain     ruq worse   • N/V   ambulated to triage. Pt having abdominal pain x 3 days, ruq worse. N/v possible blood, red in color. Pt reports unable to keep pain medications down, last took yesterday. Has pain management

## 2019-05-06 LAB
BASOPHILS # BLD AUTO: 0.1 % (ref 0–1.8)
BASOPHILS # BLD: 0.02 K/UL (ref 0–0.12)
EOSINOPHIL # BLD AUTO: 0 K/UL (ref 0–0.51)
EOSINOPHIL NFR BLD: 0 % (ref 0–6.9)
ERYTHROCYTE [DISTWIDTH] IN BLOOD BY AUTOMATED COUNT: 41.2 FL (ref 35.9–50)
HCT VFR BLD AUTO: 39.7 % (ref 42–52)
HGB BLD-MCNC: 13.5 G/DL (ref 14–18)
IMM GRANULOCYTES # BLD AUTO: 0.04 K/UL (ref 0–0.11)
IMM GRANULOCYTES NFR BLD AUTO: 0.3 % (ref 0–0.9)
LYMPHOCYTES # BLD AUTO: 0.87 K/UL (ref 1–4.8)
LYMPHOCYTES NFR BLD: 6.1 % (ref 22–41)
MCH RBC QN AUTO: 29.3 PG (ref 27–33)
MCHC RBC AUTO-ENTMCNC: 34 G/DL (ref 33.7–35.3)
MCV RBC AUTO: 86.1 FL (ref 81.4–97.8)
MONOCYTES # BLD AUTO: 0.89 K/UL (ref 0–0.85)
MONOCYTES NFR BLD AUTO: 6.2 % (ref 0–13.4)
NEUTROPHILS # BLD AUTO: 12.44 K/UL (ref 1.82–7.42)
NEUTROPHILS NFR BLD: 87.3 % (ref 44–72)
NRBC # BLD AUTO: 0 K/UL
NRBC BLD-RTO: 0 /100 WBC
PATHOLOGY CONSULT NOTE: NORMAL
PLATELET # BLD AUTO: 213 K/UL (ref 164–446)
PMV BLD AUTO: 9.4 FL (ref 9–12.9)
RBC # BLD AUTO: 4.61 M/UL (ref 4.7–6.1)
WBC # BLD AUTO: 14.3 K/UL (ref 4.8–10.8)

## 2019-05-06 PROCEDURE — 36415 COLL VENOUS BLD VENIPUNCTURE: CPT

## 2019-05-06 PROCEDURE — 770006 HCHG ROOM/CARE - MED/SURG/GYN SEMI*

## 2019-05-06 PROCEDURE — 700102 HCHG RX REV CODE 250 W/ 637 OVERRIDE(OP): Performed by: SURGERY

## 2019-05-06 PROCEDURE — 85025 COMPLETE CBC W/AUTO DIFF WBC: CPT

## 2019-05-06 PROCEDURE — A9270 NON-COVERED ITEM OR SERVICE: HCPCS | Performed by: SURGERY

## 2019-05-06 PROCEDURE — 700101 HCHG RX REV CODE 250: Performed by: SURGERY

## 2019-05-06 PROCEDURE — 700111 HCHG RX REV CODE 636 W/ 250 OVERRIDE (IP): Performed by: SURGERY

## 2019-05-06 RX ORDER — NICOTINE 21 MG/24HR
14 PATCH, TRANSDERMAL 24 HOURS TRANSDERMAL
Status: DISCONTINUED | OUTPATIENT
Start: 2019-05-06 | End: 2019-05-07 | Stop reason: HOSPADM

## 2019-05-06 RX ADMIN — OXYCODONE HYDROCHLORIDE 25 MG: 10 TABLET ORAL at 21:39

## 2019-05-06 RX ADMIN — METRONIDAZOLE 500 MG: 500 INJECTION, SOLUTION INTRAVENOUS at 15:07

## 2019-05-06 RX ADMIN — NICOTINE 14 MG: 14 PATCH, EXTENDED RELEASE TRANSDERMAL at 08:31

## 2019-05-06 RX ADMIN — ACETAMINOPHEN 1000 MG: 500 TABLET ORAL at 12:05

## 2019-05-06 RX ADMIN — DEXAMETHASONE SODIUM PHOSPHATE 4 MG: 4 INJECTION, SOLUTION INTRA-ARTICULAR; INTRALESIONAL; INTRAMUSCULAR; INTRAVENOUS; SOFT TISSUE at 05:43

## 2019-05-06 RX ADMIN — OXYCODONE HYDROCHLORIDE 20 MG: 20 TABLET, FILM COATED, EXTENDED RELEASE ORAL at 06:28

## 2019-05-06 RX ADMIN — METRONIDAZOLE 500 MG: 500 INJECTION, SOLUTION INTRAVENOUS at 06:29

## 2019-05-06 RX ADMIN — OXYCODONE HYDROCHLORIDE 20 MG: 20 TABLET, FILM COATED, EXTENDED RELEASE ORAL at 18:10

## 2019-05-06 RX ADMIN — OXYCODONE HYDROCHLORIDE 25 MG: 10 TABLET ORAL at 18:10

## 2019-05-06 RX ADMIN — OXYCODONE HYDROCHLORIDE 25 MG: 10 TABLET ORAL at 12:04

## 2019-05-06 RX ADMIN — OXYCODONE HYDROCHLORIDE 25 MG: 10 TABLET ORAL at 15:06

## 2019-05-06 RX ADMIN — KETOROLAC TROMETHAMINE 30 MG: 30 INJECTION, SOLUTION INTRAMUSCULAR at 06:28

## 2019-05-06 RX ADMIN — ONDANSETRON 4 MG: 2 INJECTION INTRAMUSCULAR; INTRAVENOUS at 03:25

## 2019-05-06 RX ADMIN — ACETAMINOPHEN 1000 MG: 500 TABLET ORAL at 18:10

## 2019-05-06 RX ADMIN — OXYCODONE HYDROCHLORIDE 20 MG: 5 TABLET ORAL at 08:31

## 2019-05-06 RX ADMIN — KETOROLAC TROMETHAMINE 30 MG: 30 INJECTION, SOLUTION INTRAMUSCULAR at 12:05

## 2019-05-06 RX ADMIN — OXYCODONE HYDROCHLORIDE 20 MG: 5 TABLET ORAL at 02:07

## 2019-05-06 RX ADMIN — ACETAMINOPHEN 1000 MG: 500 TABLET ORAL at 06:28

## 2019-05-06 RX ADMIN — HEPARIN SODIUM 5000 UNITS: 5000 INJECTION, SOLUTION INTRAVENOUS; SUBCUTANEOUS at 15:06

## 2019-05-06 RX ADMIN — METRONIDAZOLE 500 MG: 500 INJECTION, SOLUTION INTRAVENOUS at 21:38

## 2019-05-06 RX ADMIN — HEPARIN SODIUM 5000 UNITS: 5000 INJECTION, SOLUTION INTRAVENOUS; SUBCUTANEOUS at 06:28

## 2019-05-06 RX ADMIN — KETOROLAC TROMETHAMINE 30 MG: 30 INJECTION, SOLUTION INTRAMUSCULAR at 18:09

## 2019-05-06 RX ADMIN — HEPARIN SODIUM 5000 UNITS: 5000 INJECTION, SOLUTION INTRAVENOUS; SUBCUTANEOUS at 21:38

## 2019-05-06 NOTE — OR NURSING
Pt calm, AOx4, states pain is tolerable, no complaints of nausea. VSS. On 10L oxymask, O2Sat 96%. Dressing intact, ice pack to the site. Pt able to dangle and stand at the bedside, transferred to the floor via wheelchair. Family updated.

## 2019-05-06 NOTE — PROGRESS NOTES
· 2 RN skin check complete  · Devices in place: pillows in use to support elbows and head.  · Skin assessed under devices and all bony prominences.  · Only significant findings are expected surgical lap sites x 3 to abd, sun burn to face and thickened/callused skin throughout.

## 2019-05-06 NOTE — PROGRESS NOTES
Pt is A&O X4  Abdominal pain, medicated er MAR, provided ice pack  Denies nausea  Tolerating Diet well, full liquid  Surgical incision 3 lap sites to abdomen, dermabond and open to air, no drainage noted at this time.  + Urine Void   + Flatus  Up stand by assistance   SCD's on  Reviewed plan of care with patient, bed in lowest position and locked, pt resting comfortably now, call light within reach, all needs met at this time

## 2019-05-06 NOTE — ANESTHESIA POSTPROCEDURE EVALUATION
Patient: Familia Robert    Procedure Summary     Date:  05/05/19 Room / Location:  [place] [address] / SURGERY [facility]    Anesthesia Start:  1550 Anesthesia Stop:  1649    Procedure:  APPENDECTOMY, LAPAROSCOPIC (Abdomen) Diagnosis:  (Appendicitis)    Surgeon:  Antony Jolly M.D. Responsible Provider:  Vinay Olmos M.D.    Anesthesia Type:  general ASA Status:  2 - Emergent          Final Anesthesia Type: general  Last vitals  BP   Blood Pressure: 134/97, NIBP: 122/81    Temp   36.6 °C (97.8 °F)    Pulse   Pulse: 87, Heart Rate (Monitored): 69   Resp   20    SpO2   97 %      Anesthesia Post Evaluation    Patient location during evaluation: PACU  Patient participation: complete - patient participated  Level of consciousness: awake and alert  Pain score: 5    Airway patency: patent  Anesthetic complications: no  Cardiovascular status: adequate and hemodynamically stable  Respiratory status: acceptable and face mask  Hydration status: acceptable    PONV: none           Nurse Pain Score: 7 (NPRS)

## 2019-05-06 NOTE — PROGRESS NOTES
"Surgical Progress Note:      Pain and nausea overnight  Passing gas    PE:  /80   Pulse (!) 56   Temp 36.2 °C (97.2 °F) (Temporal)   Resp 18   Ht 1.753 m (5' 9\")   Wt 79.4 kg (175 lb)   SpO2 91%   BMI 25.84 kg/m²     I/O:   Intake/Output Summary (Last 24 hours) at 05/06/19 0931  Last data filed at 05/06/19 0855   Gross per 24 hour   Intake          3329.58 ml   Output              750 ml   Net          2579.58 ml     UOP:  PO:  IV:    GEN: NAD  COR: RRR  PULM: CTA  ABD: soft, ND, mild TTP at incisions      Labs:  Recent Labs      05/05/19   1320  05/06/19   0229   WBC  16.6*  14.3*   RBC  5.48  4.61*   HEMOGLOBIN  16.1  13.5*   HEMATOCRIT  46.1  39.7*   MCV  84.1  86.1   MCH  29.4  29.3   RDW  40.2  41.2   PLATELETCT  272  213   MPV  9.2  9.4   NEUTSPOLYS  88.80*  87.30*   LYMPHOCYTES  4.20*  6.10*   MONOCYTES  6.40  6.20   EOSINOPHILS  0.10  0.00   BASOPHILS  0.10  0.10     Recent Labs      05/05/19   1320   SODIUM  135   POTASSIUM  3.6   CHLORIDE  98   CO2  24   GLUCOSE  151*   BUN  13         A/P: POD# 1  S/P laparoscopic appnedectomy  Doing well overall  Chronic pain, will try to control   Advance diet  IV antibiotics for now     Antony Jolly M.D.  [facility]  [phone]    "

## 2019-05-06 NOTE — PROGRESS NOTES
Patient complains of nausea, medicated with prn zofran. Pt continues to report nausea after prn zofran. Pt had one episode of vomiting, 50 mL clear liquid.   Assisted patient to ambulate in crockett to help relieve nausea and pain, patient tolerated well with stand by assistance.  Patient continues to complain of nausea, medicated per MAR with nausea med prior to giving  Meds.

## 2019-05-06 NOTE — PROGRESS NOTES
Report received from PACU RN at 1750  Patient up to unit at 1810.    Pt is A&O x 4.  Pain reported at 7/10. Will medicate per MAR  Nausea denied  Tolerating full liquid Diet   Surgical lap sites x 3 to abd. JATIN and approximated with dermabond. No drainage noted.    - Urine output  - BM    - Flatus  ANA mobility D/T pain  SCD's in place and on  Family at bedside  Bed in lowest position and locked.  Pt resting comfortably now.  Review plan of care with patient  Call light within reach  Hourly rounds in place  All needs met at this time

## 2019-05-06 NOTE — PROGRESS NOTES
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  RN also educated on risk for infection if patient leaves AMA. Patient and patient's mother verbalize understanding.    RN paged Dr. Jolly for updates/orders.  Awaiting call back.    "

## 2019-05-06 NOTE — CARE PLAN
Problem: Knowledge Deficit  Goal: Knowledge of disease process/condition, treatment plan, diagnostic tests, and medications will improve  Outcome: PROGRESSING AS EXPECTED  Pt and family were educated on POC, MAR, shift routine and nursing education R/T Dx. Questions were encouraged and answered. Pt and family verbalized understanding of all teaching.

## 2019-05-06 NOTE — CARE PLAN
Problem: Venous Thromboembolism (VTW)/Deep Vein Thrombosis (DVT) Prevention:  Goal: Patient will participate in Venous Thrombosis (VTE)/Deep Vein Thrombosis (DVT)Prevention Measures  Outcome: PROGRESSING AS EXPECTED  Promote early ambulation and frequent ambulation in the unit's crockett. Medicate per MAR as ordered by MD. SCDs on    Problem: Pain Management  Goal: Pain level will decrease to patient's comfort goal  Outcome: PROGRESSING SLOWER THAN EXPECTED  Assess patient's pain level at minimum every 4 hours and as needed. Medicate patient for pain per MAR as ordered by MD. Provided non pharmacological interventions as needed

## 2019-05-07 VITALS
BODY MASS INDEX: 25.92 KG/M2 | HEART RATE: 61 BPM | HEIGHT: 69 IN | DIASTOLIC BLOOD PRESSURE: 76 MMHG | TEMPERATURE: 99 F | RESPIRATION RATE: 16 BRPM | OXYGEN SATURATION: 98 % | WEIGHT: 175 LBS | SYSTOLIC BLOOD PRESSURE: 141 MMHG

## 2019-05-07 LAB
BASOPHILS # BLD AUTO: 0.1 % (ref 0–1.8)
BASOPHILS # BLD: 0.01 K/UL (ref 0–0.12)
EOSINOPHIL # BLD AUTO: 0 K/UL (ref 0–0.51)
EOSINOPHIL NFR BLD: 0 % (ref 0–6.9)
ERYTHROCYTE [DISTWIDTH] IN BLOOD BY AUTOMATED COUNT: 38.7 FL (ref 35.9–50)
HCT VFR BLD AUTO: 40.5 % (ref 42–52)
HGB BLD-MCNC: 13.9 G/DL (ref 14–18)
IMM GRANULOCYTES # BLD AUTO: 0.02 K/UL (ref 0–0.11)
IMM GRANULOCYTES NFR BLD AUTO: 0.2 % (ref 0–0.9)
LYMPHOCYTES # BLD AUTO: 0.68 K/UL (ref 1–4.8)
LYMPHOCYTES NFR BLD: 6.4 % (ref 22–41)
MCH RBC QN AUTO: 28.9 PG (ref 27–33)
MCHC RBC AUTO-ENTMCNC: 34.3 G/DL (ref 33.7–35.3)
MCV RBC AUTO: 84.2 FL (ref 81.4–97.8)
MONOCYTES # BLD AUTO: 0.71 K/UL (ref 0–0.85)
MONOCYTES NFR BLD AUTO: 6.7 % (ref 0–13.4)
NEUTROPHILS # BLD AUTO: 9.15 K/UL (ref 1.82–7.42)
NEUTROPHILS NFR BLD: 86.6 % (ref 44–72)
NRBC # BLD AUTO: 0 K/UL
NRBC BLD-RTO: 0 /100 WBC
PLATELET # BLD AUTO: 221 K/UL (ref 164–446)
PMV BLD AUTO: 9.4 FL (ref 9–12.9)
RBC # BLD AUTO: 4.81 M/UL (ref 4.7–6.1)
WBC # BLD AUTO: 10.6 K/UL (ref 4.8–10.8)

## 2019-05-07 PROCEDURE — 700111 HCHG RX REV CODE 636 W/ 250 OVERRIDE (IP): Performed by: SURGERY

## 2019-05-07 PROCEDURE — 700105 HCHG RX REV CODE 258

## 2019-05-07 PROCEDURE — 85025 COMPLETE CBC W/AUTO DIFF WBC: CPT

## 2019-05-07 PROCEDURE — A9270 NON-COVERED ITEM OR SERVICE: HCPCS | Performed by: SURGERY

## 2019-05-07 PROCEDURE — 700101 HCHG RX REV CODE 250: Performed by: SURGERY

## 2019-05-07 PROCEDURE — 700105 HCHG RX REV CODE 258: Performed by: SURGERY

## 2019-05-07 PROCEDURE — 36415 COLL VENOUS BLD VENIPUNCTURE: CPT

## 2019-05-07 PROCEDURE — 700102 HCHG RX REV CODE 250 W/ 637 OVERRIDE(OP): Performed by: SURGERY

## 2019-05-07 RX ORDER — METRONIDAZOLE 500 MG/1
500 TABLET ORAL EVERY 8 HOURS
Qty: 15 TAB | Refills: 0 | Status: SHIPPED | OUTPATIENT
Start: 2019-05-07 | End: 2019-05-12

## 2019-05-07 RX ORDER — OXYCODONE HYDROCHLORIDE 5 MG/1
10-15 TABLET ORAL EVERY 6 HOURS PRN
Qty: 30 TAB | Refills: 0 | Status: SHIPPED | OUTPATIENT
Start: 2019-05-07 | End: 2019-05-14

## 2019-05-07 RX ORDER — METRONIDAZOLE 500 MG/1
500 TABLET ORAL EVERY 8 HOURS
Status: DISCONTINUED | OUTPATIENT
Start: 2019-05-07 | End: 2019-05-07 | Stop reason: HOSPADM

## 2019-05-07 RX ORDER — AMOXICILLIN AND CLAVULANATE POTASSIUM 875; 125 MG/1; MG/1
1 TABLET, FILM COATED ORAL EVERY 12 HOURS
Status: DISCONTINUED | OUTPATIENT
Start: 2019-05-07 | End: 2019-05-07 | Stop reason: HOSPADM

## 2019-05-07 RX ORDER — SODIUM CHLORIDE 9 MG/ML
INJECTION, SOLUTION INTRAVENOUS
Status: COMPLETED
Start: 2019-05-07 | End: 2019-05-07

## 2019-05-07 RX ORDER — OXYCODONE HYDROCHLORIDE 5 MG/1
10-15 TABLET ORAL EVERY 6 HOURS PRN
Qty: 30 TAB | Refills: 0 | Status: SHIPPED | OUTPATIENT
Start: 2019-05-07 | End: 2019-05-07

## 2019-05-07 RX ORDER — AMOXICILLIN AND CLAVULANATE POTASSIUM 875; 125 MG/1; MG/1
1 TABLET, FILM COATED ORAL EVERY 12 HOURS
Qty: 10 TAB | Refills: 0 | Status: SHIPPED | OUTPATIENT
Start: 2019-05-07 | End: 2019-05-12

## 2019-05-07 RX ADMIN — CEFTRIAXONE SODIUM 2 G: 2 INJECTION, POWDER, FOR SOLUTION INTRAMUSCULAR; INTRAVENOUS at 07:34

## 2019-05-07 RX ADMIN — OXYCODONE HYDROCHLORIDE 25 MG: 10 TABLET ORAL at 12:52

## 2019-05-07 RX ADMIN — ACETAMINOPHEN 1000 MG: 500 TABLET ORAL at 12:51

## 2019-05-07 RX ADMIN — OXYCODONE HYDROCHLORIDE 25 MG: 10 TABLET ORAL at 01:20

## 2019-05-07 RX ADMIN — AMOXICILLIN AND CLAVULANATE POTASSIUM 1 TABLET: 875; 125 TABLET, FILM COATED ORAL at 12:50

## 2019-05-07 RX ADMIN — OXYCODONE HYDROCHLORIDE 25 MG: 10 TABLET ORAL at 04:28

## 2019-05-07 RX ADMIN — HEPARIN SODIUM 5000 UNITS: 5000 INJECTION, SOLUTION INTRAVENOUS; SUBCUTANEOUS at 06:23

## 2019-05-07 RX ADMIN — OXYCODONE HYDROCHLORIDE 20 MG: 20 TABLET, FILM COATED, EXTENDED RELEASE ORAL at 06:23

## 2019-05-07 RX ADMIN — KETOROLAC TROMETHAMINE 30 MG: 30 INJECTION, SOLUTION INTRAMUSCULAR at 12:52

## 2019-05-07 RX ADMIN — METRONIDAZOLE 500 MG: 500 TABLET, FILM COATED ORAL at 12:51

## 2019-05-07 RX ADMIN — METRONIDAZOLE 500 MG: 500 INJECTION, SOLUTION INTRAVENOUS at 06:24

## 2019-05-07 RX ADMIN — NICOTINE 14 MG: 14 PATCH, EXTENDED RELEASE TRANSDERMAL at 06:23

## 2019-05-07 RX ADMIN — OXYCODONE HYDROCHLORIDE 25 MG: 10 TABLET ORAL at 08:58

## 2019-05-07 RX ADMIN — SODIUM CHLORIDE 500 ML: 9 INJECTION, SOLUTION INTRAVENOUS at 04:20

## 2019-05-07 NOTE — PROGRESS NOTES
Pt is A&O x4  Pain 7/10, medicated per MAR  Denies nausea  Tolerating Diet well, poor intake  3 lap sites to abdomen  + Urine Void   + Flatus  + BM Void  Up self   SCD's off, pt refuses despite education  Family at bedside  Reviewed plan of care with patient, bed in lowest position and locked, pt resting comfortably now, call light within reach, all needs met at this time

## 2019-05-07 NOTE — DISCHARGE SUMMARY
Discharge Summary      DATE OF ADMISSION: 5/5/2019    DATE OF DISCHARGE: 5/7/19    ADMISSION DIAGNOSIS (ES):  ? Acute appendicitis    DISCHARGE DIAGNOSIS (ES):  ? same    DISCHARGE CONDITION:  ? good    CONSULTATIONS:  ? none    PROCEDURES:  ? none    BRIEF HPI:  ? 42 yo male presented with acute appendicitis    HOSPITAL COURSE:  ? He was admitted and underwent the above procedure. He had purulent peritonitis, and was maintained on IV antibiotics. His leukocytosis resolved and he was transitioned to a course of oral antibiotics. By discharge. He was tolerating a diet, his pain was controlled, and he was ambulating independently. He has chronic pain issues and is on high dose opioids. He was given a prescription for short term post-operative pain management and understands he will need to discuss any further pain management needs with his PCP.      MEDS:   Current Outpatient Prescriptions   Medication Sig Dispense Refill   • metroNIDAZOLE (FLAGYL) 500 MG Tab Take 1 Tab by mouth every 8 hours for 5 days. 15 Tab 0   • amoxicillin-clavulanate (AUGMENTIN) 875-125 MG Tab Take 1 Tab by mouth every 12 hours for 5 days. 10 Tab 0   • oxyCODONE immediate-release (ROXICODONE) 5 MG Tab Take 2-3 Tabs by mouth every 6 hours as needed for Severe Pain for up to 7 days. 30 Tab 0       FOLLOW-UP:  ? Please call my office at [phone] to make an appointment in 1 week(s)    DISCHARGE INSTRUCTIONS:  Laparoscopic Appendectomy D/C instructions:    1. DIET: Upon discharge from the hospital you may resume your normal pre-operative diet. Depending on how you are feeling and whether you have nausea or not, you may wish to stay with a bland diet for the first few days. However, you can advance this as quickly as you feel ready.    2. ACTIVITIES: After discharge from the hospital, you may resume full routine activities. However, there should be no heavy lifting (greater than 15 pounds) and no strenuous activities until after your follow-up  visit. Otherwise, routine activities of daily living are acceptable.    3. DRIVING: You may drive whenever you are off pain medications and are able to perform the activities needed to drive, i.e. turning, bending, twisting, etc.    4. BATHING: You may get the wound wet at any time after leaving the hospital. You may shower, but do not submerge in a bath for at least a week. Dressings may come off after 48 hours.    5. BOWEL FUNCTION: A few patients, after this operation, will develop either frequent or loose stools after meals. This usually corrects itself after a few days, to a few weeks. If this occurs, do not worry; it is not unusual and will resolve. Much more common than loose stools, is constipation. The combination of pain medication and decreased activity level can cause constipation in otherwise normal patients. If you feel this is occurring, take a laxative (Milk of Magnesia, Ex-Lax, Senokot, etc.) until the problem has resolved.    6. PAIN MEDICATION: You will be given a prescription for pain medication at discharge. Please take these as directed. It is important to remember not to take medications on an empty stomach as this may cause nausea.    7. CALL IF YOU HAVE: (1) Fevers to more than 101.5 degrees F, (2) Unusual chest or leg pain, (3) Drainage or fluid from incision that may be foul smelling, increased tenderness or soreness at the wound or the wound edges are no longer together, redness or swelling at the incision site. Please do not hesitate to call with any other questions.     8. APPOINTMENT: Contact our office at [phone] for a follow-up appointment in 1 to 2 weeks following your procedure.    If you have any additional questions, please do not hesitate to call the office and speak to either myself or the physician on call.    Office address:  [address] [place] [address], [place] [address]      Antony Jolly M.D.  [facility]  [phone]

## 2019-05-07 NOTE — DISCHARGE INSTRUCTIONS
Discharge Instructions    Discharged to home by car with relative. Discharged via wheelchair, hospital escort: Yes.  Special equipment needed: Not Applicable    Be sure to schedule a follow-up appointment with your primary care doctor or any specialists as instructed.     Discharge Plan:   Diet Plan: Discussed  Activity Level: Discussed  Confirmed Follow up Appointment: Patient to Call and Schedule Appointment  Confirmed Symptoms Management: Discussed  Medication Reconciliation Updated: Yes  Influenza Vaccine Indication: Patient Refuses    I understand that a diet low in cholesterol, fat, and sodium is recommended for good health. Unless I have been given specific instructions below for another diet, I accept this instruction as my diet prescription.   Other diet: GI soft diet as tolerated     Special Instructions:     FOLLOW-UP:  · Please call my office at [phone] to make an appointment in 1 week(s)     DISCHARGE INSTRUCTIONS:  Laparoscopic Appendectomy D/C instructions:     1. DIET: Upon discharge from the hospital you may resume your normal pre-operative diet. Depending on how you are feeling and whether you have nausea or not, you may wish to stay with a bland diet for the first few days. However, you can advance this as quickly as you feel ready.     2. ACTIVITIES: After discharge from the hospital, you may resume full routine activities. However, there should be no heavy lifting (greater than 15 pounds) and no strenuous activities until after your follow-up visit. Otherwise, routine activities of daily living are acceptable.     3. DRIVING: You may drive whenever you are off pain medications and are able to perform the activities needed to drive, i.e. turning, bending, twisting, etc.     4. BATHING: You may get the wound wet at any time after leaving the hospital. You may shower, but do not submerge in a bath for at least a week. Dressings may come off after 48 hours.     5. BOWEL FUNCTION: A few  patients, after this operation, will develop either frequent or loose stools after meals. This usually corrects itself after a few days, to a few weeks. If this occurs, do not worry; it is not unusual and will resolve. Much more common than loose stools, is constipation. The combination of pain medication and decreased activity level can cause constipation in otherwise normal patients. If you feel this is occurring, take a laxative (Milk of Magnesia, Ex-Lax, Senokot, etc.) until the problem has resolved.     6. PAIN MEDICATION: You will be given a prescription for pain medication at discharge. Please take these as directed. It is important to remember not to take medications on an empty stomach as this may cause nausea.     7. CALL IF YOU HAVE: (1) Fevers to more than 101.5 degrees F, (2) Unusual chest or leg pain, (3) Drainage or fluid from incision that may be foul smelling, increased tenderness or soreness at the wound or the wound edges are no longer together, redness or swelling at the incision site. Please do not hesitate to call with any other questions.      8. APPOINTMENT: Contact our office at [phone] for a follow-up appointment in 1 to 2 weeks following your procedure.     If you have any additional questions, please do not hesitate to call the office and speak to either myself or the physician on call.     Office address:  [address] [place] [id number]        Antony Jolly M.D.  [facility]  [phone]       · Is patient discharged on Warfarin / Coumadin?   No       Antibiotic Medicine  Introduction  Antibiotic medicines are used to treat infections caused by bacteria. They work by hurting or killing the germs that are making you sick.  How will my medicine be picked?  There are many kinds of antibiotic medicines. To help your doctor pick one, tell your doctor if:  · You have any allergies.  · You are pregnant or plan to get pregnant.  · You are breastfeeding.  · You are  taking any medicines. These include over-the-counter medicines, prescription medicines, and herbal remedies.  · You have a medical condition or problem.  If you have questions about why your medicine was picked, ask.  For how long should I take my medicine?  Take your medicine for as long as your doctor tells you to. Do not stop taking it when you feel better. If you stop taking it too soon:  · You may start to feel sick again.  · Your infection may get harder to treat.  · New problems may develop.  What if I miss a dose?  Try not to miss any doses of antibiotic medicine. If you miss a dose:  · Take the dose as soon as you can.  · If you are taking 2 doses a day, take the next dose in 5 to 6 hours.  · If you are taking 3 or more doses a day, take the next dose in 2 to 4 hours. Then go back to the normal schedule.  If you cannot take a missed dose, take the next dose on time. Then take the missed dose after you have taken all the doses as told by your doctor, as if you had one more dose left.  Does this medicine affect birth control?  Birth control pills may not work while you are on antibiotic medicines. If you are taking birth control pills, keep taking them as usual. Use a second form of birth control, such as a condom. Keep using the second form of birth control until you are finished with your current 1 month cycle of birth control pills.  Get help if:  · You get worse.  · You do not feel better a few days after starting the medicine.  · You throw up (vomit).  · There are white patches in your mouth.  · You have new joint pain after starting the medicine.  · You have new muscle aches after starting the medicine.  · You had a fever before starting the medicine, and it comes back.  · You have any symptoms of an allergic reaction, such as an itchy rash. If this happens, stop taking the medicine.  Get help right away if:  · Your pee (urine) turns dark or becomes blood-colored.  · Your skin turns yellow.  · You  bruise or bleed easily.  · You have very bad watery poop (diarrhea) and cramps in your belly (abdomen).  · You have a very bad headache.  · You have signs of a very bad allergic reaction, such as:  ¨ Trouble breathing.  ¨ Wheezing.  ¨ Swelling of the lips, tongue, or face.  ¨ Fainting.  ¨ Blisters on the skin or in the mouth.  If you have signs of a very bad allergic reaction, stop taking the antibiotic medicine right away.  This information is not intended to replace advice given to you by your health care provider. Make sure you discuss any questions you have with your health care provider.  Document Released: 09/26/2009 Document Revised: 08/15/2017 Document Reviewed: 05/04/2016  © 2017 [employer]        Laparoscopic Appendectomy, Adult, Care After  Refer to this sheet in the next few weeks. These instructions provide you with information on caring for yourself after your procedure. Your caregiver may also give you more specific instructions. Your treatment has been planned according to current medical practices, but problems sometimes occur. Call your caregiver if you have any problems or questions after your procedure.  HOME CARE INSTRUCTIONS  · Do not drive while taking narcotic pain medicines.  · Use stool softener if you become constipated from your pain medicines.  · Change your bandages (dressings) as directed.  · Keep your wounds clean and dry. You may wash the wounds gently with soap and water. Gently pat the wounds dry with a clean towel.  · Do not take baths, swim, or use hot tubs for 10 days, or as instructed by your caregiver.  · Only take over-the-counter or prescription medicines for pain, discomfort, or fever as directed by your caregiver.  · You may continue your normal diet as directed.  · Do not lift more than 10 pounds (4.5 kg) or play contact sports for 3 weeks, or as directed.  · Slowly increase your activity after surgery.  · Take deep breaths to avoid getting a lung infection  (pneumonia).  SEEK MEDICAL CARE IF:  · You have redness, swelling, or increasing pain in your wounds.  · You have pus coming from your wounds.  · You have drainage from a wound that lasts longer than 1 day.  · You notice a bad smell coming from the wounds or dressing.  · Your wound edges break open after stitches (sutures) have been removed.  · You notice increasing pain in the shoulders (shoulder strap areas) or near your shoulder blades.  · You develop dizzy episodes or fainting while standing.  · You develop shortness of breath.  · You develop persistent nausea or vomiting.  · You cannot control your bowel functions or lose your appetite.  · You develop diarrhea.  SEEK IMMEDIATE MEDICAL CARE IF:   · You have a fever.  · You develop a rash.  · You have difficulty breathing or sharp pains in your chest.  · You develop any reaction or side effects to medicines given.  MAKE SURE YOU:  · Understand these instructions.  · Will watch your condition.  · Will get help right away if you are not doing well or get worse.     This information is not intended to replace advice given to you by your health care provider. Make sure you discuss any questions you have with your health care provider.     Document Released: 12/18/2006 Document Revised: 05/03/2016 Document Reviewed: 06/26/2012  [employer] Patient Education ©2016 [employer]      Depression / Suicide Risk    As you are discharged from this [facility] facility, it is important to learn how to keep safe from harming yourself.    Recognize the warning signs:  · Abrupt changes in personality, positive or negative- including increase in energy   · Giving away possessions  · Change in eating patterns- significant weight changes-  positive or negative  · Change in sleeping patterns- unable to sleep or sleeping all the time   · Unwillingness or inability to communicate  · Depression  · Unusual sadness, discouragement and loneliness  · Talk of wanting to  die  · Neglect of personal appearance   · Rebelliousness- reckless behavior  · Withdrawal from people/activities they love  · Confusion- inability to concentrate     If you or a loved one observes any of these behaviors or has concerns about self-harm, here's what you can do:  · Talk about it- your feelings and reasons for harming yourself  · Remove any means that you might use to hurt yourself (examples: pills, rope, extension cords, firearm)  · Get professional help from the community (Mental Health, Substance Abuse, psychological counseling)  · Do not be alone:Call your Safe Contact- someone whom you trust who will be there for you.  · Call your local CRISIS HOTLINE 392-9640 or 472-262-7827  · Call your local Children's Mobile Crisis Response Team [place] (740) 123-4437 or www.[employer]  · Call the toll free National Suicide Prevention Hotlines   · National Suicide Prevention Lifeline 939-701-QORA (7498)  · National Hope Line Network 800-SUICIDE (842-6784)

## 2019-05-07 NOTE — PROGRESS NOTES
Discharging Patient home per physician order.    Discharged with father.    Demonstrated understanding of discharge instructions, follow up appointments, home medications, prescriptions, home care for surgical wound and nursing care instructions for lap appy.  Ambulating with no assistance, voiding without difficulty, pain well controlled, tolerating oral medications, oxygen saturation greater than 90%, tolerating diet.    Educational handouts given and discussed.    Verbalized understanding of discharge instructions and educational handouts.  All questions answered.    Belongings with patient at time of discharge.

## 2019-05-07 NOTE — CARE PLAN
Problem: Safety  Goal: Will remain free from falls  Outcome: PROGRESSING AS EXPECTED  Fall risk precautions in place. Bed in lowest position, call light within reach, treaded slipper socks on pt, and appropriate communication signs on door.     Problem: Infection  Goal: Will remain free from infection  Outcome: PROGRESSING AS EXPECTED  Antibiotic therapy administered per MAR.

## 2019-05-07 NOTE — PROGRESS NOTES
"Bedside report completed.  A&O x4.  In no acute distress.   /76   Pulse 61   Temp 37.2 °C (99 °F) (Temporal)   Resp 16   Ht 1.753 m (5' 9\")   Wt 79.4 kg (175 lb)   SpO2 98%   BMI 25.84 kg/m²   Ambulating with no assistance, steady gait.  Tolerating diet, denies N/V.  C/o 7/10 pain, medicated per MAR.  Abdominal lap sites, approximated.   Last BM 5/7/19, + flatus.  + void.  Call light and personal belongings within reach.  SCDs refused despite education of importance.  POC discussed and all questions answered.  Hourly rounding and fall precautions in place.  No additional needs at this time.  "

## (undated) DEVICE — SUTURE 0 VICRYL PLUS UR-6 - 27 INCH (36/BX)

## (undated) DEVICE — GLOVE BIOGEL PI INDICATOR SZ 8.0 SURGICAL PF LF -(50/BX 4BX/CA)

## (undated) DEVICE — STAPLER 45MM ARTICULATING - (3EA/BX)

## (undated) DEVICE — SET LEADWIRE 5 LEAD BEDSIDE DISPOSABLE ECG (1SET OF 5/EA)

## (undated) DEVICE — NEPTUNE 4 PORT MANIFOLD - (20/PK)

## (undated) DEVICE — GOWN WARMING STANDARD FLEX - (30/CA)

## (undated) DEVICE — KIT ANESTHESIA W/CIRCUIT & 3/LT BAG W/FILTER (20EA/CA)

## (undated) DEVICE — STAPLE 45MM BLUE 3.5MM ECHELON (12EA/BX)

## (undated) DEVICE — CHLORAPREP 26 ML APPLICATOR - ORANGE TINT(25/CA)

## (undated) DEVICE — GLOVE SZ 8 BIOGEL PI MICRO - PF LF (50PR/BX)

## (undated) DEVICE — CANISTER SUCTION 3000ML MECHANICAL FILTER AUTO SHUTOFF MEDI-VAC NONSTERILE LF DISP  (40EA/CA)

## (undated) DEVICE — TROCAR 5X100 NON BLADED Z-TH - READ KII (6/BX)

## (undated) DEVICE — KIT ROOM DECONTAMINATION

## (undated) DEVICE — SODIUM CHL IRRIGATION 0.9% 1000ML (12EA/CA)

## (undated) DEVICE — SET SUCTION/IRRIGATION WITH DISPOSABLE TIP (6/CA )PART #0250-070-520 IS A SUB

## (undated) DEVICE — PACK LAP CHOLE OR - (2EA/CA)

## (undated) DEVICE — TROCAR Z THREAD12MM OPTICAL - NON BLADED (6/BX)

## (undated) DEVICE — SEALER VESSEL HARMONIC ACE PLUS WITH ADVANCED HEMOSTASIS 36CM (1/EA)

## (undated) DEVICE — PROTECTOR ULNA NERVE - (36PR/CA)

## (undated) DEVICE — BLADE SURGICAL CLIPPER - (50EA/CA)

## (undated) DEVICE — CANNULA W/SEAL 5X100 Z-THRE - ADED KII (12/BX)

## (undated) DEVICE — ELECTRODE DUAL RETURN W/ CORD - (50/PK)

## (undated) DEVICE — SUCTION INSTRUMENT YANKAUER BULBOUS TIP W/O VENT (50EA/CA)

## (undated) DEVICE — MASK ANESTHESIA ADULT  - (100/CA)

## (undated) DEVICE — GLOVE BIOGEL SZ 7 SURGICAL PF LTX - (50PR/BX 4BX/CA)

## (undated) DEVICE — DETERGENT RENUZYME PLUS 10 OZ PACKET (50/BX)

## (undated) DEVICE — HEAD HOLDER JUNIOR/ADULT

## (undated) DEVICE — SUTURE 4-0 VICRYL PLUS FS-2 - 27 INCH (36/BX)

## (undated) DEVICE — LACTATED RINGERS INJ 1000 ML - (14EA/CA 60CA/PF)

## (undated) DEVICE — CLIP MED LG INTNL HRZN TI ESCP - (20/BX)

## (undated) DEVICE — TUBING CLEARLINK DUO-VENT - C-FLO (48EA/CA)

## (undated) DEVICE — SLEEVE, VASO, THIGH, MED

## (undated) DEVICE — SET EXTENSION WITH 2 PORTS (48EA/CA) ***PART #2C8610 IS A SUBSTITUTE*****

## (undated) DEVICE — ELECTRODE 850 FOAM ADHESIVE - HYDROGEL RADIOTRNSPRNT (50/PK)

## (undated) DEVICE — DERMABOND ADVANCED - (12EA/BX)

## (undated) DEVICE — BAG RETRIEVAL 10ML (10EA/BX)

## (undated) DEVICE — SENSOR SPO2 NEO LNCS ADHESIVE (20/BX) SEE USER NOTES

## (undated) DEVICE — NEEDLE INSFL 120MM 14GA VRRS - (20/BX)